# Patient Record
Sex: FEMALE | Race: WHITE | NOT HISPANIC OR LATINO | Employment: OTHER | URBAN - METROPOLITAN AREA
[De-identification: names, ages, dates, MRNs, and addresses within clinical notes are randomized per-mention and may not be internally consistent; named-entity substitution may affect disease eponyms.]

---

## 2023-03-10 ENCOUNTER — TELEPHONE (OUTPATIENT)
Dept: PRIMARY CARE | Facility: CLINIC | Age: 83
End: 2023-03-10
Payer: MEDICARE

## 2023-03-10 NOTE — TELEPHONE ENCOUNTER
[facility] is calling for pt's most recent chart notes so they can fill her glucose test strips.  Fax# [phone]

## 2023-07-19 DIAGNOSIS — K21.9 GASTROESOPHAGEAL REFLUX DISEASE, UNSPECIFIED WHETHER ESOPHAGITIS PRESENT: ICD-10-CM

## 2023-07-20 PROBLEM — M17.11 RIGHT KNEE DJD: Status: ACTIVE | Noted: 2023-07-20

## 2023-07-20 PROBLEM — R55 NEAR SYNCOPE: Status: ACTIVE | Noted: 2023-07-20

## 2023-07-20 PROBLEM — G89.29 CHRONIC PAIN OF RIGHT KNEE: Status: ACTIVE | Noted: 2023-07-20

## 2023-07-20 PROBLEM — M15.9 GENERALIZED OSTEOARTHRITIS OF MULTIPLE SITES: Status: ACTIVE | Noted: 2023-07-20

## 2023-07-20 PROBLEM — I10 HTN (HYPERTENSION), BENIGN: Status: ACTIVE | Noted: 2023-07-20

## 2023-07-20 PROBLEM — M79.18 PIRIFORMIS MUSCLE PAIN: Status: ACTIVE | Noted: 2023-07-20

## 2023-07-20 PROBLEM — M25.551 PAIN OF RIGHT HIP JOINT: Status: ACTIVE | Noted: 2023-07-20

## 2023-07-20 PROBLEM — N18.9 CKD (CHRONIC KIDNEY DISEASE): Status: ACTIVE | Noted: 2023-07-20

## 2023-07-20 PROBLEM — D50.9 IRON DEFICIENCY ANEMIA: Status: ACTIVE | Noted: 2023-07-20

## 2023-07-20 PROBLEM — E78.5 HYPERLIPIDEMIA: Status: ACTIVE | Noted: 2023-07-20

## 2023-07-20 PROBLEM — R30.0 DYSURIA: Status: ACTIVE | Noted: 2023-07-20

## 2023-07-20 PROBLEM — K21.9 GERD (GASTROESOPHAGEAL REFLUX DISEASE): Status: ACTIVE | Noted: 2023-07-20

## 2023-07-20 PROBLEM — I48.0 PAROXYSMAL ATRIAL FIBRILLATION (MULTI): Status: ACTIVE | Noted: 2023-07-20

## 2023-07-20 PROBLEM — R06.09 DYSPNEA ON EXERTION: Status: ACTIVE | Noted: 2023-07-20

## 2023-07-20 PROBLEM — M25.561 CHRONIC PAIN OF RIGHT KNEE: Status: ACTIVE | Noted: 2023-07-20

## 2023-07-20 RX ORDER — LISINOPRIL 20 MG/1
1 TABLET ORAL DAILY
COMMUNITY
End: 2023-11-29 | Stop reason: SDUPTHER

## 2023-07-20 RX ORDER — OMEPRAZOLE 20 MG/1
CAPSULE, DELAYED RELEASE ORAL
Qty: 90 CAPSULE | Refills: 0 | Status: SHIPPED | OUTPATIENT
Start: 2023-07-20 | End: 2023-10-27

## 2023-07-20 RX ORDER — MELOXICAM 15 MG/1
TABLET ORAL
COMMUNITY
Start: 2022-07-14 | End: 2023-11-29 | Stop reason: SDUPTHER

## 2023-07-20 RX ORDER — METFORMIN HYDROCHLORIDE 500 MG/1
500 TABLET ORAL
COMMUNITY
End: 2023-08-02

## 2023-07-20 RX ORDER — OMEPRAZOLE 20 MG/1
CAPSULE, DELAYED RELEASE ORAL
COMMUNITY
Start: 2016-08-26 | End: 2023-11-29 | Stop reason: SDUPTHER

## 2023-07-20 RX ORDER — PRAVASTATIN SODIUM 80 MG/1
1 TABLET ORAL DAILY
COMMUNITY
Start: 2021-10-18 | End: 2023-07-31

## 2023-07-20 RX ORDER — ASPIRIN 81 MG/1
1 TABLET ORAL DAILY
COMMUNITY
Start: 2021-12-22

## 2023-07-20 RX ORDER — CALCIUM CITRATE/VITAMIN D3 200MG-6.25
TABLET ORAL
COMMUNITY
End: 2023-11-09 | Stop reason: SDUPTHER

## 2023-07-20 RX ORDER — AMLODIPINE BESYLATE 10 MG/1
1 TABLET ORAL DAILY
COMMUNITY
Start: 2021-12-22 | End: 2023-11-29 | Stop reason: SDUPTHER

## 2023-07-20 NOTE — TELEPHONE ENCOUNTER
Requested Prescriptions     Pending Prescriptions Disp Refills    omeprazole (PriLOSEC) 20 mg DR capsule [Pharmacy Med Name: OMEPRAZOLE DR CAPS 20MG] 90 capsule 3     Sig: TAKE 1 CAPSULE DAILY BEFORE A MEAL

## 2023-08-01 DIAGNOSIS — E11.9 TYPE 2 DIABETES MELLITUS WITHOUT COMPLICATION, UNSPECIFIED WHETHER LONG TERM INSULIN USE (MULTI): ICD-10-CM

## 2023-08-02 PROBLEM — E11.9 TYPE 2 DIABETES MELLITUS (MULTI): Status: ACTIVE | Noted: 2023-08-02

## 2023-08-02 RX ORDER — METFORMIN HYDROCHLORIDE 500 MG/1
500 TABLET ORAL
Qty: 60 TABLET | Refills: 0 | Status: SHIPPED | OUTPATIENT
Start: 2023-08-02 | End: 2023-08-08 | Stop reason: SDUPTHER

## 2023-08-02 NOTE — TELEPHONE ENCOUNTER
Requested Prescriptions     Pending Prescriptions Disp Refills    metFORMIN (Glucophage) 500 mg tablet [Pharmacy Med Name: METFORMIN HCL TABS 500MG] 60 tablet 0     Sig: Take 1 tablet (500 mg) by mouth 2 times a day with meals.

## 2023-08-04 DIAGNOSIS — E11.9 TYPE 2 DIABETES MELLITUS WITHOUT COMPLICATION, UNSPECIFIED WHETHER LONG TERM INSULIN USE (MULTI): ICD-10-CM

## 2023-08-04 RX ORDER — METFORMIN HYDROCHLORIDE 500 MG/1
500 TABLET ORAL
Qty: 180 TABLET | Refills: 1 | OUTPATIENT
Start: 2023-08-04 | End: 2024-01-31

## 2023-08-04 NOTE — TELEPHONE ENCOUNTER
Requested Prescriptions     Pending Prescriptions Disp Refills    metFORMIN (Glucophage) 500 mg tablet [Pharmacy Med Name: METFORMIN HCL TABS 500MG] 180 tablet 1     Sig: Take 1 tablet (500 mg) by mouth 2 times a day with meals.

## 2023-08-08 RX ORDER — METFORMIN HYDROCHLORIDE 500 MG/1
500 TABLET ORAL
Qty: 180 TABLET | Refills: 0 | Status: SHIPPED | OUTPATIENT
Start: 2023-08-08 | End: 2023-11-29 | Stop reason: SDUPTHER

## 2023-08-08 NOTE — TELEPHONE ENCOUNTER
Patient states that medication was sent to the wrong pharmacy.  Patient states that it should have gone to [facility] and not [facility].  Patient can be reached at [phone].

## 2023-11-03 DIAGNOSIS — I10 HTN (HYPERTENSION), BENIGN: ICD-10-CM

## 2023-11-03 NOTE — TELEPHONE ENCOUNTER
Can send 30 to local. Not sen since before 3/3.   Pt overdue for appointment. Multiple 30 day fills sent. Please refuse request

## 2023-11-06 RX ORDER — PRAVASTATIN SODIUM 80 MG/1
80 TABLET ORAL DAILY
Qty: 90 TABLET | Refills: 3 | Status: SHIPPED | OUTPATIENT
Start: 2023-11-06 | End: 2023-11-29 | Stop reason: SDUPTHER

## 2023-11-09 ENCOUNTER — PATIENT MESSAGE (OUTPATIENT)
Dept: PRIMARY CARE | Facility: CLINIC | Age: 83
End: 2023-11-09
Payer: MEDICARE

## 2023-11-09 DIAGNOSIS — E11.22 TYPE 2 DIABETES MELLITUS WITH STAGE 3B CHRONIC KIDNEY DISEASE, WITHOUT LONG-TERM CURRENT USE OF INSULIN (MULTI): Primary | ICD-10-CM

## 2023-11-09 DIAGNOSIS — N18.32 TYPE 2 DIABETES MELLITUS WITH STAGE 3B CHRONIC KIDNEY DISEASE, WITHOUT LONG-TERM CURRENT USE OF INSULIN (MULTI): Primary | ICD-10-CM

## 2023-11-09 NOTE — PATIENT COMMUNICATION
Requested Prescriptions     Pending Prescriptions Disp Refills    blood sugar diagnostic (True Metrix Glucose Test Strip) strip 300 strip 1     Sig: TEST blood sugar twice daily

## 2023-11-13 RX ORDER — CALCIUM CITRATE/VITAMIN D3 200MG-6.25
TABLET ORAL
Qty: 300 STRIP | Refills: 0 | Status: SHIPPED | OUTPATIENT
Start: 2023-11-13

## 2023-11-29 ENCOUNTER — OFFICE VISIT (OUTPATIENT)
Dept: PRIMARY CARE | Facility: CLINIC | Age: 83
End: 2023-11-29
Payer: MEDICARE

## 2023-11-29 ENCOUNTER — LAB (OUTPATIENT)
Dept: LAB | Facility: LAB | Age: 83
End: 2023-11-29
Payer: MEDICARE

## 2023-11-29 VITALS
HEART RATE: 75 BPM | RESPIRATION RATE: 18 BRPM | TEMPERATURE: 98.6 F | BODY MASS INDEX: 28.25 KG/M2 | DIASTOLIC BLOOD PRESSURE: 74 MMHG | WEIGHT: 175 LBS | OXYGEN SATURATION: 96 % | SYSTOLIC BLOOD PRESSURE: 130 MMHG

## 2023-11-29 DIAGNOSIS — I48.0 PAROXYSMAL ATRIAL FIBRILLATION (MULTI): ICD-10-CM

## 2023-11-29 DIAGNOSIS — E11.9 TYPE 2 DIABETES MELLITUS WITHOUT COMPLICATION, UNSPECIFIED WHETHER LONG TERM INSULIN USE (MULTI): ICD-10-CM

## 2023-11-29 DIAGNOSIS — N18.9 CHRONIC KIDNEY DISEASE, UNSPECIFIED CKD STAGE: ICD-10-CM

## 2023-11-29 DIAGNOSIS — I10 HTN (HYPERTENSION), BENIGN: ICD-10-CM

## 2023-11-29 DIAGNOSIS — Z13.21 ENCOUNTER FOR VITAMIN DEFICIENCY SCREENING: ICD-10-CM

## 2023-11-29 DIAGNOSIS — M15.9 GENERALIZED OSTEOARTHRITIS OF MULTIPLE SITES: ICD-10-CM

## 2023-11-29 DIAGNOSIS — E53.8 B12 DEFICIENCY: Primary | ICD-10-CM

## 2023-11-29 DIAGNOSIS — E78.5 HYPERLIPIDEMIA, UNSPECIFIED HYPERLIPIDEMIA TYPE: ICD-10-CM

## 2023-11-29 DIAGNOSIS — E78.1 HYPERTRIGLYCERIDEMIA: ICD-10-CM

## 2023-11-29 DIAGNOSIS — K21.9 GASTROESOPHAGEAL REFLUX DISEASE WITHOUT ESOPHAGITIS: Primary | ICD-10-CM

## 2023-11-29 DIAGNOSIS — Z13.29 SCREENING FOR THYROID DISORDER: ICD-10-CM

## 2023-11-29 LAB
ALBUMIN SERPL BCP-MCNC: 4.2 G/DL (ref 3.4–5)
ALP SERPL-CCNC: 63 U/L (ref 33–136)
ALT SERPL W P-5'-P-CCNC: 12 U/L (ref 7–45)
ANION GAP SERPL CALC-SCNC: 16 MMOL/L (ref 10–20)
AST SERPL W P-5'-P-CCNC: 15 U/L (ref 9–39)
BILIRUB SERPL-MCNC: 0.3 MG/DL (ref 0–1.2)
BUN SERPL-MCNC: 39 MG/DL (ref 6–23)
CALCIUM SERPL-MCNC: 9.6 MG/DL (ref 8.6–10.3)
CHLORIDE SERPL-SCNC: 97 MMOL/L (ref 98–107)
CHOLEST SERPL-MCNC: 153 MG/DL (ref 0–199)
CHOLESTEROL/HDL RATIO: 3.4
CO2 SERPL-SCNC: 27 MMOL/L (ref 21–32)
CREAT SERPL-MCNC: 1.39 MG/DL (ref 0.5–1.05)
ERYTHROCYTE [DISTWIDTH] IN BLOOD BY AUTOMATED COUNT: 12.5 % (ref 11.5–14.5)
EST. AVERAGE GLUCOSE BLD GHB EST-MCNC: 171 MG/DL
GFR SERPL CREATININE-BSD FRML MDRD: 37 ML/MIN/1.73M*2
GLUCOSE SERPL-MCNC: 145 MG/DL (ref 74–99)
HBA1C MFR BLD: 7.6 %
HCT VFR BLD AUTO: 40 % (ref 36–46)
HDLC SERPL-MCNC: 45.5 MG/DL
HGB BLD-MCNC: 12.6 G/DL (ref 12–16)
LDLC SERPL CALC-MCNC: 65 MG/DL
MCH RBC QN AUTO: 28.1 PG (ref 26–34)
MCHC RBC AUTO-ENTMCNC: 31.5 G/DL (ref 32–36)
MCV RBC AUTO: 89 FL (ref 80–100)
NON HDL CHOLESTEROL: 108 MG/DL (ref 0–149)
NRBC BLD-RTO: 0 /100 WBCS (ref 0–0)
PLATELET # BLD AUTO: 319 X10*3/UL (ref 150–450)
POTASSIUM SERPL-SCNC: 4.9 MMOL/L (ref 3.5–5.3)
PROT SERPL-MCNC: 7.1 G/DL (ref 6.4–8.2)
RBC # BLD AUTO: 4.49 X10*6/UL (ref 4–5.2)
SODIUM SERPL-SCNC: 135 MMOL/L (ref 136–145)
TRIGL SERPL-MCNC: 211 MG/DL (ref 0–149)
TSH SERPL-ACNC: 1.14 MIU/L (ref 0.44–3.98)
VIT B12 SERPL-MCNC: 253 PG/ML (ref 211–911)
VLDL: 42 MG/DL (ref 0–40)
WBC # BLD AUTO: 9.7 X10*3/UL (ref 4.4–11.3)

## 2023-11-29 PROCEDURE — 1036F TOBACCO NON-USER: CPT | Performed by: NURSE PRACTITIONER

## 2023-11-29 PROCEDURE — 82652 VIT D 1 25-DIHYDROXY: CPT

## 2023-11-29 PROCEDURE — 3078F DIAST BP <80 MM HG: CPT | Performed by: NURSE PRACTITIONER

## 2023-11-29 PROCEDURE — 99214 OFFICE O/P EST MOD 30 MIN: CPT | Performed by: NURSE PRACTITIONER

## 2023-11-29 PROCEDURE — 1159F MED LIST DOCD IN RCRD: CPT | Performed by: NURSE PRACTITIONER

## 2023-11-29 PROCEDURE — 3075F SYST BP GE 130 - 139MM HG: CPT | Performed by: NURSE PRACTITIONER

## 2023-11-29 PROCEDURE — 82607 VITAMIN B-12: CPT

## 2023-11-29 PROCEDURE — 83036 HEMOGLOBIN GLYCOSYLATED A1C: CPT

## 2023-11-29 PROCEDURE — 85027 COMPLETE CBC AUTOMATED: CPT

## 2023-11-29 PROCEDURE — 80053 COMPREHEN METABOLIC PANEL: CPT

## 2023-11-29 PROCEDURE — 84443 ASSAY THYROID STIM HORMONE: CPT

## 2023-11-29 PROCEDURE — 80061 LIPID PANEL: CPT

## 2023-11-29 PROCEDURE — 36415 COLL VENOUS BLD VENIPUNCTURE: CPT

## 2023-11-29 RX ORDER — PRAVASTATIN SODIUM 80 MG/1
80 TABLET ORAL DAILY
Qty: 90 TABLET | Refills: 3 | Status: SHIPPED | OUTPATIENT
Start: 2023-11-29

## 2023-11-29 RX ORDER — OMEPRAZOLE 20 MG/1
20 CAPSULE, DELAYED RELEASE ORAL
Qty: 90 CAPSULE | Refills: 1 | Status: SHIPPED | OUTPATIENT
Start: 2023-11-29 | End: 2024-05-27

## 2023-11-29 RX ORDER — VERAPAMIL HYDROCHLORIDE 240 MG/1
240 TABLET, FILM COATED, EXTENDED RELEASE ORAL NIGHTLY
COMMUNITY
End: 2023-12-04 | Stop reason: WASHOUT

## 2023-11-29 RX ORDER — MELOXICAM 15 MG/1
15 TABLET ORAL DAILY
Qty: 90 TABLET | Refills: 1 | Status: SHIPPED | OUTPATIENT
Start: 2023-11-29 | End: 2024-05-27

## 2023-11-29 RX ORDER — AMLODIPINE BESYLATE 10 MG/1
10 TABLET ORAL DAILY
Qty: 90 TABLET | Refills: 1 | Status: SHIPPED | OUTPATIENT
Start: 2023-11-29 | End: 2024-05-27

## 2023-11-29 RX ORDER — LISINOPRIL 20 MG/1
20 TABLET ORAL DAILY
Qty: 90 TABLET | Refills: 1 | Status: SHIPPED | OUTPATIENT
Start: 2023-11-29 | End: 2024-05-27

## 2023-11-29 RX ORDER — METFORMIN HYDROCHLORIDE 500 MG/1
500 TABLET ORAL
Qty: 180 TABLET | Refills: 0 | Status: SHIPPED | OUTPATIENT
Start: 2023-11-29 | End: 2024-02-21

## 2023-11-29 ASSESSMENT — ENCOUNTER SYMPTOMS
WHEEZING: 0
DIZZINESS: 0
LIGHT-HEADEDNESS: 0
CHILLS: 0
FATIGUE: 0
NECK PAIN: 1
COUGH: 0
PALPITATIONS: 0
FEVER: 0
NECK STIFFNESS: 1
SHORTNESS OF BREATH: 1

## 2023-11-29 NOTE — PROGRESS NOTES
Subjective   Meghna Escobedo is a 83 y.o. female who presents for Follow-up (Regular check up) and Neck Pain.  HPI  Review of Systems   Constitutional:  Negative for chills, fatigue and fever.   Respiratory:  Positive for shortness of breath (occasional with walking long distances). Negative for cough and wheezing.    Cardiovascular:  Negative for chest pain and palpitations.   Musculoskeletal:  Positive for neck pain and neck stiffness.   Neurological:  Negative for dizziness and light-headedness.     Objective   Physical Exam  Vitals reviewed.   Constitutional:       Appearance: Normal appearance.   HENT:      Head: Normocephalic.   Eyes:      Conjunctiva/sclera: Conjunctivae normal.   Neck:      Comments: Musculoskeletal neck discomfort, mostly posterior neck with slightly decreased ROM when turning toward her right side.   Cardiovascular:      Rate and Rhythm: Normal rate and regular rhythm.      Pulses: Normal pulses.   Pulmonary:      Breath sounds: Normal breath sounds.   Abdominal:      General: Bowel sounds are normal.      Palpations: Abdomen is soft.   Musculoskeletal:      Cervical back: Neck supple. No rigidity.      Right lower leg: No edema.      Left lower leg: No edema.   Skin:     General: Skin is warm and dry.   Neurological:      General: No focal deficit present.      Mental Status: She is alert.   Psychiatric:         Mood and Affect: Mood normal.         Thought Content: Thought content normal.     /74   Pulse 75   Temp 37 °C (98.6 °F)   Resp 18   Wt 79.4 kg (175 lb)   SpO2 96%   BMI 28.25 kg/m²   Assessment/Plan   1. Gastroesophageal reflux disease without esophagitis  omeprazole (PriLOSEC) 20 mg DR capsule      2. Type 2 diabetes mellitus without complication, unspecified whether long term insulin use (CMS/[facility])  metFORMIN (Glucophage) 500 mg tablet    Hemoglobin A1C      3. HTN (hypertension), benign  amLODIPine (Norvasc) 10 mg tablet    lisinopril 20 mg tablet    pravastatin  (Pravachol) 80 mg tablet    CBC    Comprehensive Metabolic Panel      4. Generalized osteoarthritis of multiple sites  meloxicam (Mobic) 15 mg tablet      5. Paroxysmal atrial fibrillation (CMS/HCC)        6. Chronic kidney disease, unspecified CKD stage  Comprehensive Metabolic Panel      7. Hyperlipidemia, unspecified hyperlipidemia type  Lipid Panel      8. Encounter for vitamin deficiency screening  Vitamin B12    Vitamin D 1,25 Dihydroxy (for eval of hypercalcemia)      9. Screening for thyroid disorder  Thyroid Stimulating Hormone      Follow-up in 6 months with Dr. Hudson.

## 2023-12-02 LAB — 1,25(OH)2D3 SERPL-MCNC: 39.5 PG/ML (ref 19.9–79.3)

## 2023-12-04 RX ORDER — CYANOCOBALAMIN 1000 UG/ML
1000 INJECTION, SOLUTION INTRAMUSCULAR; SUBCUTANEOUS
Status: SHIPPED | OUTPATIENT
Start: 2023-12-05 | End: 2024-04-03

## 2023-12-04 RX ORDER — FENOFIBRATE 48 MG/1
48 TABLET, FILM COATED ORAL DAILY
Qty: 90 TABLET | Refills: 1 | Status: SHIPPED | OUTPATIENT
Start: 2023-12-04 | End: 2024-05-29

## 2024-01-22 DIAGNOSIS — I10 HTN (HYPERTENSION), BENIGN: Primary | ICD-10-CM

## 2024-01-22 RX ORDER — CHLORTHALIDONE 25 MG/1
25 TABLET ORAL DAILY
Qty: 90 TABLET | Refills: 0 | Status: SHIPPED | OUTPATIENT
Start: 2024-01-22 | End: 2024-04-26

## 2024-02-20 DIAGNOSIS — E11.9 TYPE 2 DIABETES MELLITUS WITHOUT COMPLICATION, UNSPECIFIED WHETHER LONG TERM INSULIN USE (MULTI): ICD-10-CM

## 2024-02-21 RX ORDER — METFORMIN HYDROCHLORIDE 500 MG/1
500 TABLET ORAL
Qty: 180 TABLET | Refills: 1 | Status: SHIPPED | OUTPATIENT
Start: 2024-02-21 | End: 2024-08-19

## 2024-04-24 DIAGNOSIS — I10 HTN (HYPERTENSION), BENIGN: ICD-10-CM

## 2024-04-25 NOTE — TELEPHONE ENCOUNTER
Pt last OV 11/29/23    Requested Prescriptions     Pending Prescriptions Disp Refills    chlorthalidone (Hygroton) 25 mg tablet [Pharmacy Med Name: CHLORTHALIDONE TABS 25MG] 90 tablet 0     Sig: Take 1 tablet (25 mg) by mouth once daily.        36.1

## 2024-04-26 RX ORDER — CHLORTHALIDONE 25 MG/1
25 TABLET ORAL DAILY
Qty: 30 TABLET | Refills: 0 | Status: SHIPPED | OUTPATIENT
Start: 2024-04-26 | End: 2024-05-02

## 2024-07-03 DIAGNOSIS — I10 HTN (HYPERTENSION), BENIGN: ICD-10-CM

## 2024-07-03 RX ORDER — AMLODIPINE BESYLATE 10 MG/1
10 TABLET ORAL DAILY
Qty: 30 TABLET | Refills: 0 | Status: SHIPPED | OUTPATIENT
Start: 2024-07-03 | End: 2024-08-02

## 2024-07-03 NOTE — TELEPHONE ENCOUNTER
Pt last OV 11/29/23  Requested Prescriptions     Pending Prescriptions Disp Refills    amLODIPine (Norvasc) 10 mg tablet [Pharmacy Med Name: AMLODIPINE BESYLATE TABS 10MG] 30 tablet 0     Sig: Take 1 tablet (10 mg) by mouth once daily.

## 2024-07-18 DIAGNOSIS — K21.9 GASTROESOPHAGEAL REFLUX DISEASE WITHOUT ESOPHAGITIS: ICD-10-CM

## 2024-07-18 RX ORDER — OMEPRAZOLE 20 MG/1
20 CAPSULE, DELAYED RELEASE ORAL
Qty: 90 CAPSULE | Refills: 0 | Status: SHIPPED | OUTPATIENT
Start: 2024-07-18

## 2024-07-18 NOTE — TELEPHONE ENCOUNTER
Pt last OV 11/29/2023  Requested Prescriptions     Pending Prescriptions Disp Refills    omeprazole (PriLOSEC) 20 mg DR capsule [Pharmacy Med Name: OMEPRAZOLE DR CAPS 20MG] 90 capsule 0     Sig: TAKE 1 CAPSULE DAILY IN THE MORNING BEFORE A MEAL

## 2024-07-30 DIAGNOSIS — M15.9 GENERALIZED OSTEOARTHRITIS OF MULTIPLE SITES: ICD-10-CM

## 2024-07-30 DIAGNOSIS — I10 HTN (HYPERTENSION), BENIGN: ICD-10-CM

## 2024-07-31 NOTE — TELEPHONE ENCOUNTER
Pt last OV 11/29/2023  Requested Prescriptions     Pending Prescriptions Disp Refills    meloxicam (Mobic) 15 mg tablet [Pharmacy Med Name: MELOXICAM TABS 15MG] 90 tablet 0     Sig: Take 1 tablet (15 mg) by mouth once daily.

## 2024-07-31 NOTE — TELEPHONE ENCOUNTER
Pt last OV 11/29/2023  Requested Prescriptions     Pending Prescriptions Disp Refills    amLODIPine (Norvasc) 10 mg tablet [Pharmacy Med Name: AMLODIPINE BESYLATE TABS 10MG] 90 tablet 0     Sig: Take 1 tablet (10 mg) by mouth once daily.

## 2024-08-01 ENCOUNTER — APPOINTMENT (OUTPATIENT)
Dept: PRIMARY CARE | Facility: CLINIC | Age: 84
End: 2024-08-01
Payer: MEDICARE

## 2024-08-01 VITALS
TEMPERATURE: 97.2 F | SYSTOLIC BLOOD PRESSURE: 164 MMHG | HEIGHT: 66 IN | RESPIRATION RATE: 20 BRPM | DIASTOLIC BLOOD PRESSURE: 78 MMHG | BODY MASS INDEX: 26.36 KG/M2 | WEIGHT: 164 LBS | HEART RATE: 64 BPM

## 2024-08-01 DIAGNOSIS — E11.9 ENCOUNTER FOR DIABETIC FOOT EXAM (MULTI): ICD-10-CM

## 2024-08-01 DIAGNOSIS — E11.9 TYPE 2 DIABETES MELLITUS WITHOUT COMPLICATION, UNSPECIFIED WHETHER LONG TERM INSULIN USE (MULTI): ICD-10-CM

## 2024-08-01 DIAGNOSIS — N18.31 STAGE 3A CHRONIC KIDNEY DISEASE (MULTI): ICD-10-CM

## 2024-08-01 DIAGNOSIS — Z78.0 ASYMPTOMATIC MENOPAUSAL STATE: ICD-10-CM

## 2024-08-01 DIAGNOSIS — E11.22 TYPE 2 DIABETES MELLITUS WITH STAGE 3B CHRONIC KIDNEY DISEASE, WITHOUT LONG-TERM CURRENT USE OF INSULIN (MULTI): ICD-10-CM

## 2024-08-01 DIAGNOSIS — Z13.29 SCREENING FOR THYROID DISORDER: ICD-10-CM

## 2024-08-01 DIAGNOSIS — E11.9 TYPE 2 DIABETES MELLITUS WITHOUT COMPLICATION, WITHOUT LONG-TERM CURRENT USE OF INSULIN (MULTI): ICD-10-CM

## 2024-08-01 DIAGNOSIS — E78.5 HYPERLIPIDEMIA, UNSPECIFIED HYPERLIPIDEMIA TYPE: ICD-10-CM

## 2024-08-01 DIAGNOSIS — N18.32 TYPE 2 DIABETES MELLITUS WITH STAGE 3B CHRONIC KIDNEY DISEASE, WITHOUT LONG-TERM CURRENT USE OF INSULIN (MULTI): ICD-10-CM

## 2024-08-01 DIAGNOSIS — E55.9 VITAMIN D DEFICIENCY: ICD-10-CM

## 2024-08-01 DIAGNOSIS — Z00.00 ROUTINE GENERAL MEDICAL EXAMINATION AT HEALTH CARE FACILITY: Primary | ICD-10-CM

## 2024-08-01 DIAGNOSIS — I48.0 PAROXYSMAL ATRIAL FIBRILLATION (MULTI): ICD-10-CM

## 2024-08-01 DIAGNOSIS — E78.1 HYPERTRIGLYCERIDEMIA: ICD-10-CM

## 2024-08-01 DIAGNOSIS — K21.9 GASTROESOPHAGEAL REFLUX DISEASE WITHOUT ESOPHAGITIS: ICD-10-CM

## 2024-08-01 DIAGNOSIS — I10 HTN (HYPERTENSION), BENIGN: ICD-10-CM

## 2024-08-01 DIAGNOSIS — E53.8 B12 DEFICIENCY: ICD-10-CM

## 2024-08-01 LAB — POC HEMOGLOBIN A1C: 8.3 % (ref 4.2–6.5)

## 2024-08-01 PROCEDURE — G0439 PPPS, SUBSEQ VISIT: HCPCS | Performed by: NURSE PRACTITIONER

## 2024-08-01 PROCEDURE — 1036F TOBACCO NON-USER: CPT | Performed by: NURSE PRACTITIONER

## 2024-08-01 PROCEDURE — 1170F FXNL STATUS ASSESSED: CPT | Performed by: NURSE PRACTITIONER

## 2024-08-01 PROCEDURE — 1158F ADVNC CARE PLAN TLK DOCD: CPT | Performed by: NURSE PRACTITIONER

## 2024-08-01 PROCEDURE — 3078F DIAST BP <80 MM HG: CPT | Performed by: NURSE PRACTITIONER

## 2024-08-01 PROCEDURE — 3077F SYST BP >= 140 MM HG: CPT | Performed by: NURSE PRACTITIONER

## 2024-08-01 PROCEDURE — 1123F ACP DISCUSS/DSCN MKR DOCD: CPT | Performed by: NURSE PRACTITIONER

## 2024-08-01 PROCEDURE — 1159F MED LIST DOCD IN RCRD: CPT | Performed by: NURSE PRACTITIONER

## 2024-08-01 PROCEDURE — 96372 THER/PROPH/DIAG INJ SC/IM: CPT | Performed by: NURSE PRACTITIONER

## 2024-08-01 PROCEDURE — 83036 HEMOGLOBIN GLYCOSYLATED A1C: CPT | Performed by: NURSE PRACTITIONER

## 2024-08-01 RX ORDER — MULTIVITAMIN
1 TABLET ORAL DAILY
COMMUNITY

## 2024-08-01 RX ORDER — CHLORTHALIDONE 25 MG/1
25 TABLET ORAL DAILY
Qty: 90 TABLET | Refills: 0 | Status: SHIPPED | OUTPATIENT
Start: 2024-08-01 | End: 2024-10-30

## 2024-08-01 RX ORDER — METFORMIN HYDROCHLORIDE 1000 MG/1
1000 TABLET ORAL
Qty: 180 TABLET | Refills: 3 | Status: SHIPPED | OUTPATIENT
Start: 2024-08-01 | End: 2025-08-01

## 2024-08-01 RX ORDER — OMEPRAZOLE 20 MG/1
20 CAPSULE, DELAYED RELEASE ORAL
Qty: 90 CAPSULE | Refills: 0 | Status: SHIPPED | OUTPATIENT
Start: 2024-08-01

## 2024-08-01 RX ORDER — FENOFIBRATE 48 MG/1
48 TABLET, FILM COATED ORAL DAILY
Qty: 90 TABLET | Refills: 0 | Status: SHIPPED | OUTPATIENT
Start: 2024-08-01

## 2024-08-01 RX ORDER — LISINOPRIL 20 MG/1
20 TABLET ORAL DAILY
Qty: 90 TABLET | Refills: 0 | Status: SHIPPED | OUTPATIENT
Start: 2024-08-01 | End: 2024-08-01 | Stop reason: DRUGHIGH

## 2024-08-01 RX ORDER — METFORMIN HYDROCHLORIDE 500 MG/1
500 TABLET ORAL
Qty: 180 TABLET | Refills: 1 | Status: SHIPPED | OUTPATIENT
Start: 2024-08-01 | End: 2024-08-01

## 2024-08-01 RX ORDER — AMLODIPINE BESYLATE 10 MG/1
10 TABLET ORAL DAILY
Qty: 90 TABLET | Refills: 0 | OUTPATIENT
Start: 2024-08-01 | End: 2024-10-29

## 2024-08-01 RX ORDER — BENAZEPRIL HYDROCHLORIDE AND HYDROCHLOROTHIAZIDE 20; 25 MG/1; MG/1
1 TABLET ORAL DAILY
Qty: 90 TABLET | Refills: 3 | Status: SHIPPED | OUTPATIENT
Start: 2024-08-01 | End: 2025-08-01

## 2024-08-01 RX ORDER — AMLODIPINE BESYLATE 10 MG/1
10 TABLET ORAL DAILY
Qty: 90 EACH | Refills: 3 | Status: SHIPPED | OUTPATIENT
Start: 2024-08-01 | End: 2025-08-01

## 2024-08-01 RX ORDER — CALCIUM CITRATE/VITAMIN D3 200MG-6.25
TABLET ORAL
Qty: 300 STRIP | Refills: 0 | Status: SHIPPED | OUTPATIENT
Start: 2024-08-01

## 2024-08-01 RX ORDER — MELOXICAM 15 MG/1
15 TABLET ORAL DAILY
Qty: 90 TABLET | Refills: 3 | Status: SHIPPED | OUTPATIENT
Start: 2024-08-01 | End: 2025-08-01

## 2024-08-01 RX ORDER — PRAVASTATIN SODIUM 80 MG/1
80 TABLET ORAL DAILY
Qty: 90 TABLET | Refills: 3 | Status: SHIPPED | OUTPATIENT
Start: 2024-08-01

## 2024-08-01 RX ORDER — CYANOCOBALAMIN 1000 UG/ML
1000 INJECTION, SOLUTION INTRAMUSCULAR; SUBCUTANEOUS ONCE
Status: COMPLETED | OUTPATIENT
Start: 2024-08-01 | End: 2024-08-01

## 2024-08-01 ASSESSMENT — PATIENT HEALTH QUESTIONNAIRE - PHQ9
2. FEELING DOWN, DEPRESSED OR HOPELESS: NOT AT ALL
1. LITTLE INTEREST OR PLEASURE IN DOING THINGS: NOT AT ALL
SUM OF ALL RESPONSES TO PHQ9 QUESTIONS 1 AND 2: 0

## 2024-08-01 ASSESSMENT — ENCOUNTER SYMPTOMS
PALPITATIONS: 0
NAUSEA: 0
CONSTIPATION: 0
ABDOMINAL DISTENTION: 0
DIARRHEA: 0
SHORTNESS OF BREATH: 0
COUGH: 0
VOMITING: 0

## 2024-08-01 ASSESSMENT — ACTIVITIES OF DAILY LIVING (ADL)
DOING_HOUSEWORK: INDEPENDENT
TAKING_MEDICATION: INDEPENDENT
MANAGING_FINANCES: INDEPENDENT
BATHING: INDEPENDENT
GROCERY_SHOPPING: INDEPENDENT
DRESSING: INDEPENDENT

## 2024-08-01 NOTE — ASSESSMENT & PLAN NOTE
A1C 8.3% today. Pt. Checks BS BID and states it is typically 190s upon rising and closer to 100 at HS. She reports frequent urination at night, but normal during the day. States she eats 2 meals per day. Will increase metformin to 1000 mg BID with meals. Referred to [facility] for further management.

## 2024-08-01 NOTE — PROGRESS NOTES
"Subjective   Reason for Visit: Meghna Escobedo is an 83 y.o. female here for a Medicare Wellness visit.     Past Medical, Surgical, and Family History reviewed and updated in chart.    Reviewed all medications by prescribing practitioner or clinical pharmacist (such as prescriptions, OTCs, herbal therapies and supplements) and documented in the medical record.    HPI  Patient Care Team:  GARY Perez-CNP as PCP - General (Gerontology)     Review of Systems   Respiratory:  Negative for cough and shortness of breath.    Cardiovascular:  Negative for chest pain, palpitations and leg swelling.   Gastrointestinal:  Negative for abdominal distention, constipation, diarrhea, nausea and vomiting.        Increased belching.    Genitourinary:  Positive for enuresis (frequent urination at night. Normal frequency during the day.). Negative for urgency.   Objective   Vitals:  /78   Pulse 64   Temp 36.2 °C (97.2 °F)   Resp 20   Ht 1.676 m (5' 6\")   Wt 74.4 kg (164 lb)   BMI 26.47 kg/m²     Physical Exam  Vitals reviewed.   Constitutional:       Appearance: Normal appearance.   HENT:      Head: Normocephalic.      Right Ear: Tympanic membrane normal.      Left Ear: Tympanic membrane normal.   Eyes:      Conjunctiva/sclera: Conjunctivae normal.   Cardiovascular:      Rate and Rhythm: Normal rate and regular rhythm.      Pulses: Normal pulses.      Heart sounds: No murmur heard.  Pulmonary:      Effort: Pulmonary effort is normal.      Breath sounds: Normal breath sounds.   Abdominal:      General: Bowel sounds are normal.      Palpations: Abdomen is soft.   Musculoskeletal:      Cervical back: Neck supple.      Right lower leg: No edema.      Left lower leg: No edema.   Skin:     General: Skin is warm and dry.      Findings: No rash.   Neurological:      General: No focal deficit present.      Mental Status: She is alert and oriented to person, place, and time.   Psychiatric:         Mood and Affect: Mood " normal.         Thought Content: Thought content normal.     Assessment/Plan   Problem List Items Addressed This Visit             ICD-10-CM    CKD (chronic kidney disease) N18.9    Relevant Orders    Comprehensive Metabolic Panel    HTN (hypertension), benign I10    Relevant Medications    chlorthalidone (Hygroton) 25 mg tablet    amLODIPine (Norvasc) 10 mg tablet    pravastatin (Pravachol) 80 mg tablet    benazepriL-hydrochlorothiazide (Lotensin HCT) 20-25 mg tablet    Other Relevant Orders    CBC    GERD (gastroesophageal reflux disease) K21.9    Relevant Medications    omeprazole (PriLOSEC) 20 mg DR capsule    Hyperlipidemia E78.5    Relevant Medications    fenofibrate (Tricor) 48 mg tablet    Other Relevant Orders    Lipid Panel    RESOLVED: Paroxysmal atrial fibrillation (Multi) I48.0    Relevant Medications    amLODIPine (Norvasc) 10 mg tablet    Type 2 diabetes mellitus (Multi) E11.9     A1C 8.3% today. Pt. Checks BS BID and states it is typically 190s upon rising and closer to 100 at HS. She reports frequent urination at night, but normal during the day. States she eats 2 meals per day. Will increase metformin to 1000 mg BID with meals. Referred to [facility] for further management.           Relevant Medications    blood sugar diagnostic (True Metrix Glucose Test Strip) strip    metFORMIN (Glucophage) 1,000 mg tablet    Other Relevant Orders    POCT glycosylated hemoglobin (Hb A1C) manually resulted (Completed)    Follow Up In [facility] - Pharmacy    Hemoglobin A1C     Other Visit Diagnoses         Codes    Routine general medical examination at health care facility    -  Primary Z00.00    Relevant Orders    1 Year Follow Up In [facility] - PCP - Wellness Exam    Follow Up In [facility] - PCP - Established    Hypertriglyceridemia     E78.1    Relevant Medications    fenofibrate (Tricor) 48 mg tablet    B12 deficiency     E53.8    Relevant Medications    cyanocobalamin  (Vitamin B-12) injection 1,000 mcg (Completed)    Other Relevant Orders    Vitamin B12    Asymptomatic menopausal state     Z78.0    Relevant Orders    XR DEXA bone density    Vitamin D deficiency     E55.9    Relevant Orders    Vitamin D 1,25 Dihydroxy (for eval of hypercalcemia)    Screening for thyroid disorder     Z13.29    Relevant Orders    TSH with reflex to Free T4 if abnormal    Encounter for diabetic foot exam (Multi)     E11.9    No wounds or lesions. Normal appearing toenails. No tinea noted. Normal sensation.           F/U in 6 months with lab draw prior to appointment.

## 2024-08-13 ENCOUNTER — HOSPITAL ENCOUNTER (OUTPATIENT)
Dept: RADIOLOGY | Facility: CLINIC | Age: 84
Discharge: HOME | End: 2024-08-13
Payer: MEDICARE

## 2024-08-13 DIAGNOSIS — Z78.0 ASYMPTOMATIC MENOPAUSAL STATE: ICD-10-CM

## 2024-08-13 PROCEDURE — 77080 DXA BONE DENSITY AXIAL: CPT

## 2024-08-13 ASSESSMENT — LIFESTYLE VARIABLES
3_OR_MORE_DRINKS_PER_DAY: N
CURRENT_SMOKER: N

## 2024-08-18 PROBLEM — M85.852 OSTEOPENIA OF NECK OF LEFT FEMUR: Status: ACTIVE | Noted: 2024-08-18

## 2024-08-22 ENCOUNTER — TELEPHONE (OUTPATIENT)
Dept: PRIMARY CARE | Facility: CLINIC | Age: 84
End: 2024-08-22
Payer: MEDICARE

## 2024-08-22 NOTE — TELEPHONE ENCOUNTER
----- Message from Cate Rojas sent at 8/18/2024 10:52 PM EDT -----  Please call patient (I don't think she uses her MyChart) and notify her that her DEXA scan was generally good, with only mild osteopenia in only 1 of the areas tested, which was her left hip. She should continue to take her calcium-vitamin D daily supplement and should try to engage in weight-bearing exercise at least 2-3 times per week, such as walking and/or using resistance bands.

## 2024-08-27 ENCOUNTER — APPOINTMENT (OUTPATIENT)
Dept: PHARMACY | Facility: HOSPITAL | Age: 84
End: 2024-08-27
Payer: MEDICARE

## 2024-08-27 DIAGNOSIS — N18.32 TYPE 2 DIABETES MELLITUS WITH STAGE 3B CHRONIC KIDNEY DISEASE, WITHOUT LONG-TERM CURRENT USE OF INSULIN (MULTI): ICD-10-CM

## 2024-08-27 DIAGNOSIS — E11.22 TYPE 2 DIABETES MELLITUS WITH STAGE 3B CHRONIC KIDNEY DISEASE, WITHOUT LONG-TERM CURRENT USE OF INSULIN (MULTI): ICD-10-CM

## 2024-08-27 NOTE — PROGRESS NOTES
Subjective     Patient ID: Meghna Escobedo is a 84 y.o. female who presents for diabetes management.    Diabetes  She presents for her initial diabetic visit. She has type 2 diabetes mellitus. Risk factors for coronary artery disease include diabetes mellitus, hypertension and dyslipidemia. Current diabetic treatment includes oral agent (monotherapy). An ACE inhibitor/angiotensin II receptor blocker is being taken.     No Known Allergies    Objective     Current DM Pharmacotherapy:   -metformin 1,000 mg 1 po bid    SECONDARY PREVENTION  - Statin? Yes  - ACE-I/ARB? Yes  - Aspirin? No    Current monitoring regimen:   Patient is using: glucometer    SMBG Readings: none to report    Any episodes of hypoglycemia? No  Hypoglycemia awareness? No    Pertinent PMH Review:  - PMH of Pancreatitis: No  - PMH/FH of Medullary Thyroid Cancer: No  - PMH of Retinopathy: No  - PMH of Urinary Tract Infections: No    Lab Review  Lab Results   Component Value Date    BILITOT 0.3 11/29/2023    CALCIUM 9.6 11/29/2023    CO2 27 11/29/2023    CL 97 (L) 11/29/2023    CREATININE 1.39 (H) 11/29/2023    GLUCOSE 145 (H) 11/29/2023    ALKPHOS 63 11/29/2023    K 4.9 11/29/2023    PROT 7.1 11/29/2023     (L) 11/29/2023    AST 15 11/29/2023    ALT 12 11/29/2023    BUN 39 (H) 11/29/2023    ANIONGAP 16 11/29/2023    ALBUMIN 4.2 11/29/2023    GFRF 43 (A) 01/16/2023     Lab Results   Component Value Date    TRIG 211 (H) 11/29/2023    CHOL 153 11/29/2023    LDLCALC 65 11/29/2023    HDL 45.5 11/29/2023     Lab Results   Component Value Date    HGBA1C 8.3 (A) 08/01/2024     The ASCVD Risk score (Eliecer DK, et al., 2019) failed to calculate for the following reasons:    The 2019 ASCVD risk score is only valid for ages 40 to 79    Assessment/Plan   Reviewed medications with patient. Patient's metformin was recently increased from 1,000 mg/ day to 2,000 mg/ day. Patient's most recent GFR shows kidney function can not support increase. Recommend patient  decrease metformin back to 1,000 mg/ day until patient gets repeat comprehensive metabolic panel. If GFR above 45 at that time, will reconsider increasing metformin. Due to low kidney function, discussed starting Jardiance to help with preserving kidney function. Test claim showed estimated copay of $30/ month. Patient reports that copay would be a stretch for her monthly budget. Discussed  PAP, which patient seems to qualify for. Patient to send 1040 tax form, will follow up in 1 week with  PAP determination.     Patient Assistance Screening (VAF)    Patient verbally reports monthly or yearly income which is less than 400% federal poverty level     Application for program has been submitted for the following medications: Jardiance    Patient has been informed that program team will be reaching out to them to discuss necessary documentation, instructed to answer phone/return voicemail.     Patient aware this process may take up to 6 weeks.     If approved medication must be filled through [facility] pharmacy and may be picked up or mailed to patient.     Jardiance Education:     - Counseled patient on Jardiance MOA, expectations, side effects, duration of therapy, administration, and monitoring parameters.  - Reviewed the benefits of SGLT-2i therapy, such as glycemic control and kidney and CV protection.  - Advised patient to practice proper  hygiene to reduce risk of UTIs or yeast infections.  - Advised patient to maintain adequate fluid intake to remain hydrated while on SGLT2i therapy.  - Answered all patient questions and concerns.    Type 2 diabetes mellitus, is not at goal. <7.5%    DECREASE metformin to 1,000 mg daily  Follow up: I recommend diabetes care be 1 week.    Xiomara Luke, PharmD    Continue all meds under the continuation of care with the referring provider and clinical pharmacy team

## 2024-08-28 PROCEDURE — RXMED WILLOW AMBULATORY MEDICATION CHARGE

## 2024-08-29 ENCOUNTER — PHARMACY VISIT (OUTPATIENT)
Dept: PHARMACY | Facility: CLINIC | Age: 84
End: 2024-08-29
Payer: COMMERCIAL

## 2024-08-30 ENCOUNTER — TELEPHONE (OUTPATIENT)
Dept: PRIMARY CARE | Facility: CLINIC | Age: 84
End: 2024-08-30
Payer: MEDICARE

## 2024-08-30 NOTE — TELEPHONE ENCOUNTER
----- Message from Cate Rojas sent at 8/29/2024 12:06 AM EDT -----  Regarding: Labs  Please call patient and remind her to please have her fasting labs drawn that were ordered at her 8/1/24 appointment.  ----- Message -----  From: Xiomara Luke PharmD  Sent: 8/28/2024  10:06 AM EDT  To: Cate Rojas, GARY-CNP

## 2024-09-03 ENCOUNTER — APPOINTMENT (OUTPATIENT)
Dept: PHARMACY | Facility: HOSPITAL | Age: 84
End: 2024-09-03
Payer: MEDICARE

## 2024-09-03 DIAGNOSIS — E11.22 TYPE 2 DIABETES MELLITUS WITH STAGE 3B CHRONIC KIDNEY DISEASE, WITHOUT LONG-TERM CURRENT USE OF INSULIN (MULTI): ICD-10-CM

## 2024-09-03 DIAGNOSIS — N18.32 TYPE 2 DIABETES MELLITUS WITH STAGE 3B CHRONIC KIDNEY DISEASE, WITHOUT LONG-TERM CURRENT USE OF INSULIN (MULTI): ICD-10-CM

## 2024-09-04 ENCOUNTER — APPOINTMENT (OUTPATIENT)
Dept: LAB | Facility: LAB | Age: 84
End: 2024-09-04
Payer: MEDICARE

## 2024-09-24 ENCOUNTER — APPOINTMENT (OUTPATIENT)
Dept: PHARMACY | Facility: HOSPITAL | Age: 84
End: 2024-09-24
Payer: MEDICARE

## 2024-09-24 DIAGNOSIS — N18.32 TYPE 2 DIABETES MELLITUS WITH STAGE 3B CHRONIC KIDNEY DISEASE, WITHOUT LONG-TERM CURRENT USE OF INSULIN (MULTI): ICD-10-CM

## 2024-09-24 DIAGNOSIS — E11.22 TYPE 2 DIABETES MELLITUS WITH STAGE 3B CHRONIC KIDNEY DISEASE, WITHOUT LONG-TERM CURRENT USE OF INSULIN (MULTI): ICD-10-CM

## 2024-09-24 NOTE — PROGRESS NOTES
Subjective     Patient ID: Meghna Escobedo is a 84 y.o. female who presents for diabetes management.    Diabetes  She presents for her follow-up diabetic visit. She has type 2 diabetes mellitus. Risk factors for coronary artery disease include diabetes mellitus, hypertension and dyslipidemia. Current diabetic treatment includes oral agent (monotherapy). An ACE inhibitor/angiotensin II receptor blocker is being taken.     No Known Allergies    Objective     Current DM Pharmacotherapy:   -metformin 1,000 mg 1 po bid  -Jardiance 10 mg daily    SECONDARY PREVENTION  - Statin? Yes  - ACE-I/ARB? Yes  - Aspirin? No    Current monitoring regimen:   Patient is using: glucometer    SMBG Readings: none to report    Any episodes of hypoglycemia? No  Hypoglycemia awareness? No    Pertinent PMH Review:  - PMH of Pancreatitis: No  - PMH/FH of Medullary Thyroid Cancer: No  - PMH of Retinopathy: No  - PMH of Urinary Tract Infections: No    Lab Review  Lab Results   Component Value Date    BILITOT 0.3 11/29/2023    CALCIUM 9.6 11/29/2023    CO2 27 11/29/2023    CL 97 (L) 11/29/2023    CREATININE 1.39 (H) 11/29/2023    GLUCOSE 145 (H) 11/29/2023    ALKPHOS 63 11/29/2023    K 4.9 11/29/2023    PROT 7.1 11/29/2023     (L) 11/29/2023    AST 15 11/29/2023    ALT 12 11/29/2023    BUN 39 (H) 11/29/2023    ANIONGAP 16 11/29/2023    ALBUMIN 4.2 11/29/2023    GFRF 43 (A) 01/16/2023     Lab Results   Component Value Date    TRIG 211 (H) 11/29/2023    CHOL 153 11/29/2023    LDLCALC 65 11/29/2023    HDL 45.5 11/29/2023     Lab Results   Component Value Date    HGBA1C 8.3 (A) 08/01/2024     The ASCVD Risk score (Eliecer JETER, et al., 2019) failed to calculate for the following reasons:    The 2019 ASCVD risk score is only valid for ages 40 to 79    Assessment/Plan   Patient had no issues with starting Jardiance. Has not tested her BG since starting medication. Will increase Jardiance to 25 mg daily and will follow up in 4 weeks for dose  tolerability.    Type 2 diabetes mellitus, is not at goal. <7.5%    INCREASE Jardiance to 25 mg daily  Follow up: I recommend diabetes care be 4 weeks.    Xiomara Luke, PharmD    Continue all meds under the continuation of care with the referring provider and clinical pharmacy team

## 2024-10-01 ENCOUNTER — PHARMACY VISIT (OUTPATIENT)
Dept: PHARMACY | Facility: CLINIC | Age: 84
End: 2024-10-01
Payer: COMMERCIAL

## 2024-10-01 PROCEDURE — RXMED WILLOW AMBULATORY MEDICATION CHARGE

## 2024-10-22 ENCOUNTER — APPOINTMENT (OUTPATIENT)
Dept: PHARMACY | Facility: HOSPITAL | Age: 84
End: 2024-10-22
Payer: MEDICARE

## 2024-10-22 DIAGNOSIS — N18.32 TYPE 2 DIABETES MELLITUS WITH STAGE 3B CHRONIC KIDNEY DISEASE, WITHOUT LONG-TERM CURRENT USE OF INSULIN (MULTI): ICD-10-CM

## 2024-10-22 DIAGNOSIS — E11.22 TYPE 2 DIABETES MELLITUS WITH STAGE 3B CHRONIC KIDNEY DISEASE, WITHOUT LONG-TERM CURRENT USE OF INSULIN (MULTI): ICD-10-CM

## 2024-11-04 PROCEDURE — RXMED WILLOW AMBULATORY MEDICATION CHARGE

## 2024-11-05 ENCOUNTER — PHARMACY VISIT (OUTPATIENT)
Dept: PHARMACY | Facility: CLINIC | Age: 84
End: 2024-11-05
Payer: COMMERCIAL

## 2024-11-19 ENCOUNTER — APPOINTMENT (OUTPATIENT)
Dept: PHARMACY | Facility: HOSPITAL | Age: 84
End: 2024-11-19
Payer: MEDICARE

## 2024-11-19 DIAGNOSIS — I10 HTN (HYPERTENSION), BENIGN: ICD-10-CM

## 2024-11-19 DIAGNOSIS — N18.32 TYPE 2 DIABETES MELLITUS WITH STAGE 3B CHRONIC KIDNEY DISEASE, WITHOUT LONG-TERM CURRENT USE OF INSULIN (MULTI): ICD-10-CM

## 2024-11-19 DIAGNOSIS — E11.22 TYPE 2 DIABETES MELLITUS WITH STAGE 3B CHRONIC KIDNEY DISEASE, WITHOUT LONG-TERM CURRENT USE OF INSULIN (MULTI): ICD-10-CM

## 2024-11-19 DIAGNOSIS — E78.1 HYPERTRIGLYCERIDEMIA: ICD-10-CM

## 2024-11-19 DIAGNOSIS — E78.5 HYPERLIPIDEMIA, UNSPECIFIED HYPERLIPIDEMIA TYPE: ICD-10-CM

## 2024-11-20 RX ORDER — FENOFIBRATE 48 MG/1
48 TABLET, FILM COATED ORAL DAILY
Qty: 90 TABLET | Refills: 3 | Status: SHIPPED | OUTPATIENT
Start: 2024-11-20

## 2024-11-20 RX ORDER — CHLORTHALIDONE 25 MG/1
25 TABLET ORAL DAILY
Qty: 90 TABLET | Refills: 3 | Status: SHIPPED | OUTPATIENT
Start: 2024-11-20

## 2024-11-20 NOTE — TELEPHONE ENCOUNTER
Pharmacy requests prescription below    Last Office Visit: 8/1/2024   Next Office Visit: Visit date not found     Requested Prescriptions     Pending Prescriptions Disp Refills    fenofibrate (Tricor) 48 mg tablet [Pharmacy Med Name: FENOFIBRATE TABS 48MG] 90 tablet 3     Sig: TAKE 1 TABLET DAILY    chlorthalidone (Hygroton) 25 mg tablet [Pharmacy Med Name: CHLORTHALIDONE TABS 25MG] 90 tablet 3     Sig: TAKE 1 TABLET DAILY

## 2024-11-25 NOTE — PROGRESS NOTES
Subjective     Patient ID: Meghna Escobedo is a 84 y.o. female who presents for diabetes management.    Diabetes  She presents for her follow-up diabetic visit. She has type 2 diabetes mellitus. Risk factors for coronary artery disease include diabetes mellitus, hypertension and dyslipidemia. Current diabetic treatment includes oral agent (monotherapy). An ACE inhibitor/angiotensin II receptor blocker is being taken.     No Known Allergies    Objective     Current DM Pharmacotherapy:   -metformin 1,000 mg 1 po bid  -Jardiance 25 mg daily    SECONDARY PREVENTION  - Statin? Yes  - ACE-I/ARB? Yes  - Aspirin? No    Current monitoring regimen:   Patient is using: glucometer    SMBG Readings: 171, 211, 165, 183, 182, 183    Any episodes of hypoglycemia? No  Hypoglycemia awareness? No    Pertinent PMH Review:  - PMH of Pancreatitis: No  - PMH/FH of Medullary Thyroid Cancer: No  - PMH of Retinopathy: No  - PMH of Urinary Tract Infections: No    Lab Review  Lab Results   Component Value Date    BILITOT 0.3 11/29/2023    CALCIUM 9.6 11/29/2023    CO2 27 11/29/2023    CL 97 (L) 11/29/2023    CREATININE 1.39 (H) 11/29/2023    GLUCOSE 145 (H) 11/29/2023    ALKPHOS 63 11/29/2023    K 4.9 11/29/2023    PROT 7.1 11/29/2023     (L) 11/29/2023    AST 15 11/29/2023    ALT 12 11/29/2023    BUN 39 (H) 11/29/2023    ANIONGAP 16 11/29/2023    ALBUMIN 4.2 11/29/2023    GFRF 43 (A) 01/16/2023     Lab Results   Component Value Date    TRIG 211 (H) 11/29/2023    CHOL 153 11/29/2023    LDLCALC 65 11/29/2023    HDL 45.5 11/29/2023     Lab Results   Component Value Date    HGBA1C 8.3 (A) 08/01/2024     The ASCVD Risk score (Eliecer DK, et al., 2019) failed to calculate for the following reasons:    The 2019 ASCVD risk score is only valid for ages 40 to 79    Assessment/Plan   Patient had no issues with starting Jardiance. Patient's morning BG readings were 171, 211, 165, 183, 182, 183. BG is still higher then patient's fasting goal, but  patient is hesitant to add on anymore new medication at this time. Will continue Jardiance 25 mg daily and will follow up in 4 weeks for dose tolerability and FBG readings.    Type 2 diabetes mellitus, is not at goal. <8%    CONTINUE Jardiance 25 mg daily  Follow up: I recommend diabetes care be 4 weeks.    Xiomara Luke, PharmD    Continue all meds under the continuation of care with the referring provider and clinical pharmacy team

## 2024-11-28 PROCEDURE — RXMED WILLOW AMBULATORY MEDICATION CHARGE

## 2024-12-02 ENCOUNTER — PHARMACY VISIT (OUTPATIENT)
Dept: PHARMACY | Facility: CLINIC | Age: 84
End: 2024-12-02
Payer: COMMERCIAL

## 2024-12-17 ENCOUNTER — APPOINTMENT (OUTPATIENT)
Dept: PHARMACY | Facility: HOSPITAL | Age: 84
End: 2024-12-17
Payer: MEDICARE

## 2024-12-20 ENCOUNTER — OFFICE VISIT (OUTPATIENT)
Dept: PRIMARY CARE | Facility: CLINIC | Age: 84
End: 2024-12-20
Payer: MEDICARE

## 2024-12-20 ENCOUNTER — LAB (OUTPATIENT)
Dept: LAB | Facility: LAB | Age: 84
End: 2024-12-20
Payer: MEDICARE

## 2024-12-20 VITALS
HEART RATE: 65 BPM | RESPIRATION RATE: 18 BRPM | BODY MASS INDEX: 25.55 KG/M2 | HEIGHT: 66 IN | WEIGHT: 159 LBS | TEMPERATURE: 97.3 F | SYSTOLIC BLOOD PRESSURE: 130 MMHG | DIASTOLIC BLOOD PRESSURE: 75 MMHG | OXYGEN SATURATION: 95 %

## 2024-12-20 DIAGNOSIS — E78.5 HYPERLIPIDEMIA, UNSPECIFIED HYPERLIPIDEMIA TYPE: ICD-10-CM

## 2024-12-20 DIAGNOSIS — N18.31 STAGE 3A CHRONIC KIDNEY DISEASE (MULTI): ICD-10-CM

## 2024-12-20 DIAGNOSIS — M85.852 OSTEOPENIA OF NECK OF LEFT FEMUR: ICD-10-CM

## 2024-12-20 DIAGNOSIS — E11.9 TYPE 2 DIABETES MELLITUS WITHOUT COMPLICATION, WITHOUT LONG-TERM CURRENT USE OF INSULIN (MULTI): ICD-10-CM

## 2024-12-20 DIAGNOSIS — K21.9 GASTROESOPHAGEAL REFLUX DISEASE WITHOUT ESOPHAGITIS: ICD-10-CM

## 2024-12-20 DIAGNOSIS — Z00.00 WELLNESS EXAMINATION: ICD-10-CM

## 2024-12-20 DIAGNOSIS — Z00.00 WELLNESS EXAMINATION: Primary | ICD-10-CM

## 2024-12-20 DIAGNOSIS — D50.9 IRON DEFICIENCY ANEMIA, UNSPECIFIED IRON DEFICIENCY ANEMIA TYPE: ICD-10-CM

## 2024-12-20 DIAGNOSIS — I10 HTN (HYPERTENSION), BENIGN: Primary | ICD-10-CM

## 2024-12-20 DIAGNOSIS — I50.32 CHRONIC DIASTOLIC (CONGESTIVE) HEART FAILURE: ICD-10-CM

## 2024-12-20 LAB
25(OH)D3 SERPL-MCNC: 61 NG/ML (ref 30–100)
ALBUMIN SERPL BCP-MCNC: 4 G/DL (ref 3.4–5)
ALP SERPL-CCNC: 53 U/L (ref 33–136)
ALT SERPL W P-5'-P-CCNC: 8 U/L (ref 7–45)
ANION GAP SERPL CALC-SCNC: 15 MMOL/L (ref 10–20)
AST SERPL W P-5'-P-CCNC: 12 U/L (ref 9–39)
BILIRUB SERPL-MCNC: 0.3 MG/DL (ref 0–1.2)
BUN SERPL-MCNC: 51 MG/DL (ref 6–23)
CALCIUM SERPL-MCNC: 9.4 MG/DL (ref 8.6–10.3)
CHLORIDE SERPL-SCNC: 103 MMOL/L (ref 98–107)
CHOLEST SERPL-MCNC: 162 MG/DL (ref 0–199)
CHOLESTEROL/HDL RATIO: 3.6
CO2 SERPL-SCNC: 28 MMOL/L (ref 21–32)
CREAT SERPL-MCNC: 1.98 MG/DL (ref 0.5–1.05)
EGFRCR SERPLBLD CKD-EPI 2021: 24 ML/MIN/1.73M*2
ERYTHROCYTE [DISTWIDTH] IN BLOOD BY AUTOMATED COUNT: 13.2 % (ref 11.5–14.5)
EST. AVERAGE GLUCOSE BLD GHB EST-MCNC: 171 MG/DL
GLUCOSE SERPL-MCNC: 147 MG/DL (ref 74–99)
HBA1C MFR BLD: 7.6 %
HCT VFR BLD AUTO: 39.3 % (ref 36–46)
HDLC SERPL-MCNC: 45.4 MG/DL
HGB BLD-MCNC: 12.1 G/DL (ref 12–16)
LDLC SERPL CALC-MCNC: 89 MG/DL
MCH RBC QN AUTO: 27.5 PG (ref 26–34)
MCHC RBC AUTO-ENTMCNC: 30.8 G/DL (ref 32–36)
MCV RBC AUTO: 89 FL (ref 80–100)
NON HDL CHOLESTEROL: 117 MG/DL (ref 0–149)
NRBC BLD-RTO: 0 /100 WBCS (ref 0–0)
PLATELET # BLD AUTO: 308 X10*3/UL (ref 150–450)
POTASSIUM SERPL-SCNC: 4.8 MMOL/L (ref 3.5–5.3)
PROT SERPL-MCNC: 6.5 G/DL (ref 6.4–8.2)
RBC # BLD AUTO: 4.4 X10*6/UL (ref 4–5.2)
SODIUM SERPL-SCNC: 141 MMOL/L (ref 136–145)
TRIGL SERPL-MCNC: 139 MG/DL (ref 0–149)
TSH SERPL-ACNC: 0.84 MIU/L (ref 0.44–3.98)
VIT B12 SERPL-MCNC: 1071 PG/ML (ref 211–911)
VLDL: 28 MG/DL (ref 0–40)
WBC # BLD AUTO: 8 X10*3/UL (ref 4.4–11.3)

## 2024-12-20 PROCEDURE — 84443 ASSAY THYROID STIM HORMONE: CPT

## 2024-12-20 PROCEDURE — 83036 HEMOGLOBIN GLYCOSYLATED A1C: CPT

## 2024-12-20 PROCEDURE — 82306 VITAMIN D 25 HYDROXY: CPT

## 2024-12-20 PROCEDURE — 85027 COMPLETE CBC AUTOMATED: CPT

## 2024-12-20 PROCEDURE — 82607 VITAMIN B-12: CPT

## 2024-12-20 PROCEDURE — 99214 OFFICE O/P EST MOD 30 MIN: CPT | Performed by: NURSE PRACTITIONER

## 2024-12-20 PROCEDURE — 36415 COLL VENOUS BLD VENIPUNCTURE: CPT

## 2024-12-20 PROCEDURE — 80061 LIPID PANEL: CPT

## 2024-12-20 PROCEDURE — 80053 COMPREHEN METABOLIC PANEL: CPT

## 2024-12-20 RX ORDER — OMEPRAZOLE 20 MG/1
20 CAPSULE, DELAYED RELEASE ORAL
Qty: 90 CAPSULE | Refills: 3 | Status: SHIPPED | OUTPATIENT
Start: 2024-12-20 | End: 2025-12-20

## 2024-12-20 RX ORDER — ACETAMINOPHEN 325 MG/1
650 TABLET ORAL
COMMUNITY
Start: 2020-10-09

## 2024-12-20 RX ORDER — LISINOPRIL 20 MG/1
20 TABLET ORAL DAILY
Qty: 100 TABLET | Refills: 3 | Status: SHIPPED | OUTPATIENT
Start: 2024-12-20 | End: 2026-01-24

## 2024-12-20 RX ORDER — LISINOPRIL 20 MG/1
20 TABLET ORAL DAILY
Qty: 100 TABLET | Refills: 3 | Status: SHIPPED | OUTPATIENT
Start: 2024-12-20 | End: 2024-12-20

## 2024-12-20 ASSESSMENT — ENCOUNTER SYMPTOMS
NAUSEA: 0
VOMITING: 0
DYSPHORIC MOOD: 0
ABDOMINAL PAIN: 0
NERVOUS/ANXIOUS: 0
BACK PAIN: 0
NECK PAIN: 1
CONSTIPATION: 0
SHORTNESS OF BREATH: 1
LIGHT-HEADEDNESS: 1
PALPITATIONS: 0
DIARRHEA: 0
SLEEP DISTURBANCE: 0
FATIGUE: 1

## 2024-12-20 NOTE — PROGRESS NOTES
Subjective   Meghna Escobedo is a 84 y.o. female who presents for Follow-up (6 month follow-up. Patient has active labs from August in chart. She has been fasting and will have labs drawn after her visit today. ).    HPI  Review of Systems   Constitutional:  Positive for fatigue (chronic).   HENT:  Negative for congestion.    Respiratory:  Positive for shortness of breath (with exertion, unchanged).    Cardiovascular:  Negative for chest pain, palpitations and leg swelling.   Gastrointestinal:  Negative for abdominal pain, constipation, diarrhea, nausea and vomiting.   Musculoskeletal:  Positive for neck pain. Negative for back pain.   Neurological:  Positive for light-headedness (occasional, even while sitting).   Psychiatric/Behavioral:  Negative for dysphoric mood and sleep disturbance. The patient is not nervous/anxious.    Objective     Physical Exam  Vitals reviewed.   Constitutional:       Appearance: Normal appearance.   HENT:      Head: Normocephalic.      Right Ear: Tympanic membrane normal.      Left Ear: Tympanic membrane normal.   Eyes:      Conjunctiva/sclera: Conjunctivae normal.   Cardiovascular:      Rate and Rhythm: Normal rate and regular rhythm.      Pulses: Normal pulses.      Heart sounds: No murmur heard.  Pulmonary:      Effort: Pulmonary effort is normal.      Breath sounds: Normal breath sounds.   Abdominal:      General: Bowel sounds are normal.      Palpations: Abdomen is soft.   Musculoskeletal:      Cervical back: Neck supple.      Right lower leg: No edema.      Left lower leg: No edema.      Comments: Uses a walker or a cane outside of the house. Does not use an assistive device in her own home. Reports no falls since last visit, but had a near fall in her home once, but caught herself and sat on nearby chair.   Skin:     General: Skin is warm and dry.      Findings: No rash.   Neurological:      General: No focal deficit present.      Mental Status: She is alert and oriented to  "person, place, and time.   Psychiatric:         Mood and Affect: Mood normal.         Thought Content: Thought content normal.       /75   Pulse 65   Temp 36.3 °C (97.3 °F)   Resp 18   Ht 1.676 m (5' 6\")   Wt 72.1 kg (159 lb)   SpO2 95%   BMI 25.66 kg/m²     Assessment/Plan     Problem List Items Addressed This Visit       HTN (hypertension), benign - Primary    Current Assessment & Plan     Patient had redundant thiazides on her med list. discontinue the benazapril-hydrochlorothiazide, continue chlorthalidone 25 mg, and add lisinopril 20 mg daily. Pt will check home BP at least weekly.            Relevant Medications    lisinopril 20 mg tablet    Other Relevant Orders    Follow Up In [facility] - PCP - [facility]    Follow Up In [facility] - PCP - Medicare Annual    GERD (gastroesophageal reflux disease)    Relevant Medications    omeprazole (PriLOSEC) 20 mg DR capsule    Hyperlipidemia    Current Assessment & Plan     Controlled. Will continue current treatment plan.  Will repeat lipid panel today.          Type 2 diabetes mellitus    Current Assessment & Plan     Repeat A1C. Follows w/ Leslie CraigD. Controlled. Will continue current treatment plan.           Chronic diastolic (congestive) heart failure    Current Assessment & Plan     Currently euvolemic. Continue current regimen.            "

## 2024-12-20 NOTE — ASSESSMENT & PLAN NOTE
Patient had redundant thiazides on her med list. discontinue the benazapril-hydrochlorothiazide, continue chlorthalidone 25 mg, and add lisinopril 20 mg daily. Pt will check home BP at least weekly.

## 2024-12-24 ENCOUNTER — APPOINTMENT (OUTPATIENT)
Dept: PHARMACY | Facility: HOSPITAL | Age: 84
End: 2024-12-24
Payer: MEDICARE

## 2024-12-24 DIAGNOSIS — N18.32 TYPE 2 DIABETES MELLITUS WITH STAGE 3B CHRONIC KIDNEY DISEASE, WITHOUT LONG-TERM CURRENT USE OF INSULIN (MULTI): ICD-10-CM

## 2024-12-24 DIAGNOSIS — E11.22 TYPE 2 DIABETES MELLITUS WITH STAGE 3B CHRONIC KIDNEY DISEASE, WITHOUT LONG-TERM CURRENT USE OF INSULIN (MULTI): ICD-10-CM

## 2024-12-28 PROCEDURE — RXMED WILLOW AMBULATORY MEDICATION CHARGE

## 2024-12-30 ENCOUNTER — PHARMACY VISIT (OUTPATIENT)
Dept: PHARMACY | Facility: CLINIC | Age: 84
End: 2024-12-30
Payer: COMMERCIAL

## 2024-12-30 DIAGNOSIS — N18.4 CKD (CHRONIC KIDNEY DISEASE), STAGE IV (MULTI): Primary | ICD-10-CM

## 2024-12-30 NOTE — PROGRESS NOTES
Subjective     Patient ID: Meghna Escobedo is a 84 y.o. female who presents for diabetes management.    Diabetes  She presents for her follow-up diabetic visit. She has type 2 diabetes mellitus. Risk factors for coronary artery disease include diabetes mellitus, hypertension and dyslipidemia. Current diabetic treatment includes oral agent (monotherapy). An ACE inhibitor/angiotensin II receptor blocker is being taken.     No Known Allergies    Objective     Current DM Pharmacotherapy:   -metformin 1,000 mg 1 po bid  -Jardiance 25 mg daily    SECONDARY PREVENTION  - Statin? Yes  - ACE-I/ARB? Yes  - Aspirin? No    Current monitoring regimen:   Patient is using: glucometer    SMBG Readings: 171, 211, 165, 183, 182, 183    Any episodes of hypoglycemia? No  Hypoglycemia awareness? No    Pertinent PMH Review:  - PMH of Pancreatitis: No  - PMH/FH of Medullary Thyroid Cancer: No  - PMH of Retinopathy: No  - PMH of Urinary Tract Infections: No    Lab Review  Lab Results   Component Value Date    BILITOT 0.3 12/20/2024    CALCIUM 9.4 12/20/2024    CO2 28 12/20/2024     12/20/2024    CREATININE 1.98 (H) 12/20/2024    GLUCOSE 147 (H) 12/20/2024    ALKPHOS 53 12/20/2024    K 4.8 12/20/2024    PROT 6.5 12/20/2024     12/20/2024    AST 12 12/20/2024    ALT 8 12/20/2024    BUN 51 (H) 12/20/2024    ANIONGAP 15 12/20/2024    ALBUMIN 4.0 12/20/2024    GFRF 43 (A) 01/16/2023     Lab Results   Component Value Date    TRIG 139 12/20/2024    CHOL 162 12/20/2024    LDLCALC 89 12/20/2024    HDL 45.4 12/20/2024     Lab Results   Component Value Date    HGBA1C 7.6 (H) 12/20/2024     The ASCVD Risk score (Eliecer JETER, et al., 2019) failed to calculate for the following reasons:    The 2019 ASCVD risk score is only valid for ages 40 to 79    Assessment/Plan   Patient had no issues with Jardiance. A1C is now in goal at 7.6%. Kidney function decreased since last visit. Will follow up in 8 to review kidney function.    Type 2 diabetes  mellitus, is at goal. <8%    CONTINUE Jardiance 25 mg daily  Follow up: I recommend diabetes care be 8 weeks.    Xiomara Luke, PharmD    Continue all meds under the continuation of care with the referring provider and clinical pharmacy team

## 2024-12-31 ENCOUNTER — TELEPHONE (OUTPATIENT)
Dept: PRIMARY CARE | Facility: CLINIC | Age: 84
End: 2024-12-31
Payer: MEDICARE

## 2024-12-31 DIAGNOSIS — I10 HTN (HYPERTENSION), BENIGN: ICD-10-CM

## 2024-12-31 NOTE — TELEPHONE ENCOUNTER
PT needs a refill for...    lisinopril 20 mg tablet     Please send to...    [facility]    Thank you

## 2025-01-10 ENCOUNTER — APPOINTMENT (OUTPATIENT)
Dept: NEPHROLOGY | Facility: CLINIC | Age: 85
End: 2025-01-10
Payer: MEDICARE

## 2025-01-10 ENCOUNTER — LAB (OUTPATIENT)
Dept: LAB | Facility: LAB | Age: 85
End: 2025-01-10
Payer: MEDICARE

## 2025-01-10 VITALS
SYSTOLIC BLOOD PRESSURE: 165 MMHG | HEART RATE: 77 BPM | HEIGHT: 66 IN | BODY MASS INDEX: 24.53 KG/M2 | WEIGHT: 152.6 LBS | DIASTOLIC BLOOD PRESSURE: 86 MMHG

## 2025-01-10 DIAGNOSIS — N18.31 STAGE 3A CHRONIC KIDNEY DISEASE (MULTI): ICD-10-CM

## 2025-01-10 DIAGNOSIS — I10 HTN (HYPERTENSION), BENIGN: ICD-10-CM

## 2025-01-10 DIAGNOSIS — N18.32 TYPE 2 DIABETES MELLITUS WITH STAGE 3B CHRONIC KIDNEY DISEASE, WITHOUT LONG-TERM CURRENT USE OF INSULIN (MULTI): ICD-10-CM

## 2025-01-10 DIAGNOSIS — N18.4 CKD (CHRONIC KIDNEY DISEASE), STAGE IV (MULTI): ICD-10-CM

## 2025-01-10 DIAGNOSIS — Z13.29 SCREENING FOR THYROID DISORDER: ICD-10-CM

## 2025-01-10 DIAGNOSIS — E08.22 DIABETES MELLITUS DUE TO UNDERLYING CONDITION WITH DIABETIC CHRONIC KIDNEY DISEASE, UNSPECIFIED CKD STAGE, UNSPECIFIED WHETHER LONG TERM INSULIN USE: ICD-10-CM

## 2025-01-10 DIAGNOSIS — E55.9 VITAMIN D DEFICIENCY: ICD-10-CM

## 2025-01-10 DIAGNOSIS — E11.22 TYPE 2 DIABETES MELLITUS WITH STAGE 3B CHRONIC KIDNEY DISEASE, WITHOUT LONG-TERM CURRENT USE OF INSULIN (MULTI): ICD-10-CM

## 2025-01-10 DIAGNOSIS — E08.22 DIABETES MELLITUS DUE TO UNDERLYING CONDITION WITH DIABETIC CHRONIC KIDNEY DISEASE, UNSPECIFIED CKD STAGE, UNSPECIFIED WHETHER LONG TERM INSULIN USE: Primary | ICD-10-CM

## 2025-01-10 DIAGNOSIS — E78.5 HYPERLIPIDEMIA, UNSPECIFIED HYPERLIPIDEMIA TYPE: ICD-10-CM

## 2025-01-10 DIAGNOSIS — E53.8 B12 DEFICIENCY: ICD-10-CM

## 2025-01-10 DIAGNOSIS — I10 ESSENTIAL HYPERTENSION: ICD-10-CM

## 2025-01-10 LAB
25(OH)D3 SERPL-MCNC: 64 NG/ML (ref 30–100)
ALBUMIN SERPL BCP-MCNC: 4.3 G/DL (ref 3.4–5)
ALP SERPL-CCNC: 50 U/L (ref 33–136)
ALT SERPL W P-5'-P-CCNC: 9 U/L (ref 7–45)
ANION GAP SERPL CALC-SCNC: 15 MMOL/L (ref 10–20)
AST SERPL W P-5'-P-CCNC: 14 U/L (ref 9–39)
BACTERIA #/AREA URNS AUTO: ABNORMAL /HPF
BILIRUB SERPL-MCNC: 0.4 MG/DL (ref 0–1.2)
BUN SERPL-MCNC: 48 MG/DL (ref 6–23)
CALCIUM SERPL-MCNC: 9.7 MG/DL (ref 8.6–10.3)
CHLORIDE SERPL-SCNC: 104 MMOL/L (ref 98–107)
CHOLEST SERPL-MCNC: 197 MG/DL (ref 0–199)
CHOLESTEROL/HDL RATIO: 3.9
CO2 SERPL-SCNC: 27 MMOL/L (ref 21–32)
CREAT SERPL-MCNC: 1.9 MG/DL (ref 0.5–1.05)
CREAT UR-MCNC: 75.7 MG/DL (ref 20–320)
EGFRCR SERPLBLD CKD-EPI 2021: 26 ML/MIN/1.73M*2
ERYTHROCYTE [DISTWIDTH] IN BLOOD BY AUTOMATED COUNT: 13.3 % (ref 11.5–14.5)
GLUCOSE SERPL-MCNC: 187 MG/DL (ref 74–99)
HCT VFR BLD AUTO: 42.3 % (ref 36–46)
HDLC SERPL-MCNC: 50.6 MG/DL
HGB BLD-MCNC: 12.7 G/DL (ref 12–16)
LDLC SERPL CALC-MCNC: 109 MG/DL
MCH RBC QN AUTO: 27.1 PG (ref 26–34)
MCHC RBC AUTO-ENTMCNC: 30 G/DL (ref 32–36)
MCV RBC AUTO: 90 FL (ref 80–100)
MUCOUS THREADS #/AREA URNS AUTO: ABNORMAL /LPF
NON HDL CHOLESTEROL: 146 MG/DL (ref 0–149)
NRBC BLD-RTO: 0 /100 WBCS (ref 0–0)
PLATELET # BLD AUTO: 327 X10*3/UL (ref 150–450)
POTASSIUM SERPL-SCNC: 5.2 MMOL/L (ref 3.5–5.3)
PROT SERPL-MCNC: 7 G/DL (ref 6.4–8.2)
PROT UR-ACNC: 15 MG/DL (ref 5–24)
PROT/CREAT UR: 0.2 MG/MG CREAT (ref 0–0.17)
RBC # BLD AUTO: 4.68 X10*6/UL (ref 4–5.2)
RBC #/AREA URNS AUTO: ABNORMAL /HPF
SODIUM SERPL-SCNC: 141 MMOL/L (ref 136–145)
SQUAMOUS #/AREA URNS AUTO: ABNORMAL /HPF
TRIGL SERPL-MCNC: 188 MG/DL (ref 0–149)
TSH SERPL-ACNC: 1.26 MIU/L (ref 0.44–3.98)
VIT B12 SERPL-MCNC: 651 PG/ML (ref 211–911)
VLDL: 38 MG/DL (ref 0–40)
WBC # BLD AUTO: 10.2 X10*3/UL (ref 4.4–11.3)
WBC #/AREA URNS AUTO: ABNORMAL /HPF
WBC CLUMPS #/AREA URNS AUTO: ABNORMAL /HPF

## 2025-01-10 PROCEDURE — 80053 COMPREHEN METABOLIC PANEL: CPT

## 2025-01-10 PROCEDURE — 80061 LIPID PANEL: CPT

## 2025-01-10 PROCEDURE — 83970 ASSAY OF PARATHORMONE: CPT

## 2025-01-10 PROCEDURE — 82306 VITAMIN D 25 HYDROXY: CPT

## 2025-01-10 PROCEDURE — 83036 HEMOGLOBIN GLYCOSYLATED A1C: CPT

## 2025-01-10 PROCEDURE — 3079F DIAST BP 80-89 MM HG: CPT | Performed by: INTERNAL MEDICINE

## 2025-01-10 PROCEDURE — 1159F MED LIST DOCD IN RCRD: CPT | Performed by: INTERNAL MEDICINE

## 2025-01-10 PROCEDURE — 84156 ASSAY OF PROTEIN URINE: CPT

## 2025-01-10 PROCEDURE — 85027 COMPLETE CBC AUTOMATED: CPT

## 2025-01-10 PROCEDURE — 1123F ACP DISCUSS/DSCN MKR DOCD: CPT | Performed by: INTERNAL MEDICINE

## 2025-01-10 PROCEDURE — 84443 ASSAY THYROID STIM HORMONE: CPT

## 2025-01-10 PROCEDURE — 82607 VITAMIN B-12: CPT

## 2025-01-10 PROCEDURE — 82652 VIT D 1 25-DIHYDROXY: CPT

## 2025-01-10 PROCEDURE — 81001 URINALYSIS AUTO W/SCOPE: CPT

## 2025-01-10 PROCEDURE — 99204 OFFICE O/P NEW MOD 45 MIN: CPT | Performed by: INTERNAL MEDICINE

## 2025-01-10 PROCEDURE — 1036F TOBACCO NON-USER: CPT | Performed by: INTERNAL MEDICINE

## 2025-01-10 PROCEDURE — 82570 ASSAY OF URINE CREATININE: CPT

## 2025-01-10 PROCEDURE — 36415 COLL VENOUS BLD VENIPUNCTURE: CPT

## 2025-01-10 PROCEDURE — 3077F SYST BP >= 140 MM HG: CPT | Performed by: INTERNAL MEDICINE

## 2025-01-10 NOTE — PROGRESS NOTES
Meghna Escobedo   84 y.o.      Vitals:    01/10/25 0950   Weight: 69.2 kg (152 lb 9.6 oz)      MRN/Room: [id number]/Room/bed info not found      History Of Present Illness  Meghna Escobedo is a 84 y.o. female presenting with high Cr level.     Past Medical History  She has a past medical history of Paroxysmal atrial fibrillation (Multi) (07/20/2023).    Surgical History  She has a past surgical history that includes Other surgical history (07/23/2019).     Social History  She reports that she has never smoked. She has never been exposed to tobacco smoke. She has never used smokeless tobacco. She reports current alcohol use. She reports that she does not use drugs.    Family History  No family history on file.     Allergies  Patient has no known allergies.      Meds:         Current Outpatient Medications   Medication Sig Dispense Refill    acetaminophen (Tylenol) 325 mg tablet 2 tablets (650 mg).      amLODIPine (Norvasc) 10 mg tablet Take 1 tablet (10 mg) by mouth once daily. 90 each 3    blood sugar diagnostic (True Metrix Glucose Test Strip) strip TEST blood sugar twice daily 300 strip 0    calcium carbonate-vitamin D3 (Calcium 600 + D,3,) 600 mg-10 mcg (400 unit) tablet Take 1 tablet by mouth once daily.      chlorthalidone (Hygroton) 25 mg tablet TAKE 1 TABLET DAILY 90 tablet 3    empagliflozin (Jardiance) 25 mg Take 1 tablet (25 mg) by mouth once daily. 90 tablet 3    fenofibrate (Tricor) 48 mg tablet TAKE 1 TABLET DAILY 90 tablet 3    lisinopril 20 mg tablet Take 1 tablet (20 mg) by mouth once daily. 100 tablet 3    metFORMIN (Glucophage) 1,000 mg tablet Take 1 tablet (1,000 mg) by mouth 2 times daily (morning and late afternoon). 180 tablet 3    omeprazole (PriLOSEC) 20 mg DR capsule Take 1 capsule (20 mg) by mouth once daily in the morning. Take before meals. 90 capsule 3    pravastatin (Pravachol) 80 mg tablet Take 1 tablet (80 mg) by mouth once daily. 90 tablet 3    meloxicam (Mobic) 15 mg tablet Take 1  tablet (15 mg) by mouth once daily. (Patient not taking: Reported on 1/10/2025) 90 tablet 3     No current facility-administered medications for this visit.         ROS:  The patient is awake and oriented. No dizziness or lightheadedness. No chills and no fever. No headaches. No nausea and no vomiting. No shortness of breath. No cough. No chest pain. No abdominal pain. No diarrhea. No hematemesis or hemoptysis. No hematuria. No rectal bleeding. No melena. No epistaxis. No urinary symptoms. No flank pain. No leg edema. No leg pain. No itching. Overall, the rest of the review of systems is also negative.  12 point review of systems otherwise negative as stated in HPI.        Physical Exam:        Vitals:    01/10/25 0950   BP: 165/86   Pulse: 77     General: The patient is awake, oriented, and is not in any distress.  Head and Neck: Normocephalic. No periorbital edema.  Eyes: Not icteric.   Respiratory: Symmetric chest expansion. No respiratory distress.  Skin: No maculopapular rash.  Musculoskeletal: No peripheral edema.  Neuro Exam: Speech is fluent. Moves extremities.        Blood Labs:  No results found for this or any previous visit (from the past 24 hours).   Lab Results   Component Value Date    GLUCOSE 147 (H) 12/20/2024    CALCIUM 9.4 12/20/2024     12/20/2024    K 4.8 12/20/2024    CO2 28 12/20/2024     12/20/2024    BUN 51 (H) 12/20/2024    CREATININE 1.98 (H) 12/20/2024       Imaging:        Assessment and Plan:  #1 high creatinine level.  The patient had recent blood work which showed creatinine level of 1.98.  She has history of hypertension and diabetes.  Her creatinine level back in November 2023 was 1.39.  Her diabetes is not under control.  I asked for a basic metabolic panel, PTH, 25-hydroxy vitamin D, microscopic urinalysis, spot urine protein to creatinine ratio, and a kidney ultrasound.    2.  Diabetes.  I asked for spot urine protein to creatinine ratio.    3.  Hypertension.  Blood  pressure is not controlled control.  If blood pressure stays high I will adjust her antihypertensive medications.    I will see her in about 2 to 3 weeks for follow-up    Catracho Landry MD  Senior Attending Physician  Director of Onco-Nephrology Program  Division of Nephrology & Hypertension  [facility]

## 2025-01-11 LAB
EST. AVERAGE GLUCOSE BLD GHB EST-MCNC: 174 MG/DL
HBA1C MFR BLD: 7.7 %
PTH-INTACT SERPL-MCNC: 54.5 PG/ML (ref 18.5–88)

## 2025-01-13 ENCOUNTER — TELEPHONE (OUTPATIENT)
Dept: NEPHROLOGY | Facility: CLINIC | Age: 85
End: 2025-01-13
Payer: MEDICARE

## 2025-01-13 LAB — 1,25(OH)2D SERPL-MCNC: 33.6 PG/ML (ref 19.9–79.3)

## 2025-01-13 NOTE — TELEPHONE ENCOUNTER
----- Message from Catracho Landry sent at 1/10/2025  1:23 PM EST -----  1.  No change in kidney function compared to the previous blood work but overall worse compared to last year.  2.  Urine test is suggestive for urinary tract infection.  Please find if she is symptomatic?

## 2025-01-15 ENCOUNTER — APPOINTMENT (OUTPATIENT)
Dept: NEPHROLOGY | Facility: CLINIC | Age: 85
End: 2025-01-15
Payer: MEDICARE

## 2025-01-17 ENCOUNTER — HOSPITAL ENCOUNTER (OUTPATIENT)
Dept: RADIOLOGY | Facility: CLINIC | Age: 85
Discharge: HOME | End: 2025-01-17
Payer: MEDICARE

## 2025-01-17 DIAGNOSIS — E08.22 DIABETES MELLITUS DUE TO UNDERLYING CONDITION WITH DIABETIC CHRONIC KIDNEY DISEASE, UNSPECIFIED CKD STAGE, UNSPECIFIED WHETHER LONG TERM INSULIN USE: ICD-10-CM

## 2025-01-17 DIAGNOSIS — N18.4 CKD (CHRONIC KIDNEY DISEASE), STAGE IV (MULTI): ICD-10-CM

## 2025-01-17 DIAGNOSIS — I10 ESSENTIAL HYPERTENSION: ICD-10-CM

## 2025-01-17 PROCEDURE — 76770 US EXAM ABDO BACK WALL COMP: CPT

## 2025-01-20 ENCOUNTER — TELEPHONE (OUTPATIENT)
Dept: NEPHROLOGY | Facility: CLINIC | Age: 85
End: 2025-01-20
Payer: MEDICARE

## 2025-01-20 NOTE — TELEPHONE ENCOUNTER
----- Message from Catracho Landry sent at 1/20/2025  3:34 AM EST -----  Small L kidney and cysts in both kidneys.

## 2025-01-27 PROCEDURE — RXMED WILLOW AMBULATORY MEDICATION CHARGE

## 2025-01-29 ENCOUNTER — PHARMACY VISIT (OUTPATIENT)
Dept: PHARMACY | Facility: CLINIC | Age: 85
End: 2025-01-29
Payer: COMMERCIAL

## 2025-01-29 DIAGNOSIS — I10 HTN (HYPERTENSION), BENIGN: ICD-10-CM

## 2025-01-29 RX ORDER — LISINOPRIL 20 MG/1
20 TABLET ORAL DAILY
Qty: 90 TABLET | Refills: 3 | Status: SHIPPED | OUTPATIENT
Start: 2025-01-29 | End: 2025-01-31 | Stop reason: ALTCHOICE

## 2025-01-29 NOTE — TELEPHONE ENCOUNTER
Pharmacy requests prescription below    Last Office Visit: 12/20/2024   Next Office Visit: 2/17/2025     Requested Prescriptions     Pending Prescriptions Disp Refills    lisinopril 20 mg tablet [Pharmacy Med Name: LISINOPRIL TABS 20MG] 90 tablet 3     Sig: TAKE 1 TABLET DAILY

## 2025-01-31 ENCOUNTER — APPOINTMENT (OUTPATIENT)
Dept: NEPHROLOGY | Facility: CLINIC | Age: 85
End: 2025-01-31
Payer: MEDICARE

## 2025-01-31 VITALS
HEART RATE: 70 BPM | SYSTOLIC BLOOD PRESSURE: 136 MMHG | HEIGHT: 66 IN | DIASTOLIC BLOOD PRESSURE: 68 MMHG | BODY MASS INDEX: 25.84 KG/M2 | WEIGHT: 160.8 LBS

## 2025-01-31 DIAGNOSIS — N18.4 CKD (CHRONIC KIDNEY DISEASE), STAGE IV (MULTI): Primary | ICD-10-CM

## 2025-01-31 DIAGNOSIS — R80.8 OTHER PROTEINURIA: ICD-10-CM

## 2025-01-31 DIAGNOSIS — E08.22 DIABETES MELLITUS DUE TO UNDERLYING CONDITION WITH DIABETIC CHRONIC KIDNEY DISEASE, UNSPECIFIED CKD STAGE, UNSPECIFIED WHETHER LONG TERM INSULIN USE: ICD-10-CM

## 2025-01-31 DIAGNOSIS — I10 ESSENTIAL HYPERTENSION: ICD-10-CM

## 2025-01-31 PROCEDURE — 1123F ACP DISCUSS/DSCN MKR DOCD: CPT | Performed by: INTERNAL MEDICINE

## 2025-01-31 PROCEDURE — 1036F TOBACCO NON-USER: CPT | Performed by: INTERNAL MEDICINE

## 2025-01-31 PROCEDURE — 3078F DIAST BP <80 MM HG: CPT | Performed by: INTERNAL MEDICINE

## 2025-01-31 PROCEDURE — 3075F SYST BP GE 130 - 139MM HG: CPT | Performed by: INTERNAL MEDICINE

## 2025-01-31 PROCEDURE — 1159F MED LIST DOCD IN RCRD: CPT | Performed by: INTERNAL MEDICINE

## 2025-01-31 PROCEDURE — 99214 OFFICE O/P EST MOD 30 MIN: CPT | Performed by: INTERNAL MEDICINE

## 2025-01-31 RX ORDER — LISINOPRIL 40 MG/1
40 TABLET ORAL DAILY
Qty: 90 TABLET | Refills: 3 | Status: SHIPPED | OUTPATIENT
Start: 2025-01-31 | End: 2026-01-31

## 2025-01-31 NOTE — PROGRESS NOTES
Meghna Doraxu   84 y.o.    @@  [facility]/Room: [id number]/Room/bed info not found    Subjective:   The patient is being seen for a routine clinic follow-up of chronic kidney disease. Recently, the disease has been stable. Disease complications:  No hyperkalemia, no hypocalcemia, no hyperphosphatemia, no metabolic acidosis, no coagulopathy, no uremic encephalopathy, no neuropathy and no renal osteodystrophy. The patient is currently asymptomatic. No associated symptoms are reported.       Meds:   Current Outpatient Medications   Medication Sig Dispense Refill    acetaminophen (Tylenol) 325 mg tablet 2 tablets (650 mg).      amLODIPine (Norvasc) 10 mg tablet Take 1 tablet (10 mg) by mouth once daily. 90 each 3    blood sugar diagnostic (True Metrix Glucose Test Strip) strip TEST blood sugar twice daily 300 strip 0    calcium carbonate-vitamin D3 (Calcium 600 + D,3,) 600 mg-10 mcg (400 unit) tablet Take 1 tablet by mouth once daily.      chlorthalidone (Hygroton) 25 mg tablet TAKE 1 TABLET DAILY 90 tablet 3    empagliflozin (Jardiance) 25 mg Take 1 tablet (25 mg) by mouth once daily. 90 tablet 3    fenofibrate (Tricor) 48 mg tablet TAKE 1 TABLET DAILY 90 tablet 3    metFORMIN (Glucophage) 1,000 mg tablet Take 1 tablet (1,000 mg) by mouth 2 times daily (morning and late afternoon). 180 tablet 3    omeprazole (PriLOSEC) 20 mg DR capsule Take 1 capsule (20 mg) by mouth once daily in the morning. Take before meals. 90 capsule 3    pravastatin (Pravachol) 80 mg tablet Take 1 tablet (80 mg) by mouth once daily. 90 tablet 3    lisinopril 40 mg tablet Take 1 tablet (40 mg) by mouth once daily. 90 tablet 3     No current facility-administered medications for this visit.          ROS:  The patient is awake and oriented. No dizziness or lightheadedness. No chills and no fever. No headaches. No nausea and no vomiting. No shortness of breath. No cough. No chest pain. No abdominal pain. No diarrhea. No hematemesis or hemoptysis. No  hematuria. No rectal bleeding. No melena. No epistaxis. No urinary symptoms. No flank pain. No leg edema. No itching. Overall, the rest of the review of systems is also negative.  12 point review of systems otherwise negative as stated in HPI.        Physical Examination:        Vitals:    01/31/25 1023   BP: 136/68   Pulse: 70     General: The patient is awake, oriented, and is not in any distress.  Head and Neck: Normocephalic. No periorbital edema.  Eyes: non-icteric  Respiratory: Symmetric chest expansion. No respiratory distress.  Skin: No maculopapular rash.  Musculoskeletal: No peripheral edema.  Neuro Exam: Speech is fluent. Moves extremities.    Imaging:  === 01/17/25 ===    US RENAL COMPLETE    - Impression -  Bilateral renal cortical cysts as detailed above.    No hydronephrosis bilaterally.    No shadowing calculus bilaterally.    Renal cortical thinning and increased echogenicity on the left.    The urinary bladder is poorly distended and not well evaluated on  this examination.      MACRO:  None    Signed by: Levi Hay 1/18/2025 9:05 AM  Dictation workstation:   [id number]       Blood Labs:  No results found for this or any previous visit (from the past 24 hours).   Lab Results   Component Value Date    PTH 54.5 01/10/2025      Lab Results   Component Value Date    GLUCOSE 187 (H) 01/10/2025    CALCIUM 9.7 01/10/2025     01/10/2025    K 5.2 01/10/2025    CO2 27 01/10/2025     01/10/2025    BUN 48 (H) 01/10/2025    CREATININE 1.90 (H) 01/10/2025         Assessment and Plan:  #1  Chronic kidney disease stage IV.  Last creatinine was 1.9.  Normal potassium and bicarb level.  Kidney ultrasound shows a small left kidney.  Volume status is good.  No microscopic hematuria.    2.  Proteinuria.  She has about 200 mg proteinuria.  This is because of diabetic nephropathy.  She is on Jardiance and lisinopril.  I increased her lisinopril dose which will also help with her high blood  pressure.     3.  Hypertension.  Blood pressure is not controlled control.  I increased lisinopril dose as mentioned above.    I will see her in about 4-month for follow-up          Catracho Landry MD  Senior Attending Physician  Director of Onco-Nephrology Program  Division of Nephrology & Hypertension  [facility]

## 2025-02-17 ENCOUNTER — APPOINTMENT (OUTPATIENT)
Dept: PRIMARY CARE | Facility: CLINIC | Age: 85
End: 2025-02-17
Payer: MEDICARE

## 2025-02-17 VITALS
BODY MASS INDEX: 25.5 KG/M2 | WEIGHT: 158 LBS | DIASTOLIC BLOOD PRESSURE: 64 MMHG | HEART RATE: 76 BPM | TEMPERATURE: 98 F | SYSTOLIC BLOOD PRESSURE: 144 MMHG

## 2025-02-17 DIAGNOSIS — I10 HTN (HYPERTENSION), BENIGN: Primary | ICD-10-CM

## 2025-02-17 DIAGNOSIS — N18.4 STAGE 4 CHRONIC KIDNEY DISEASE (MULTI): ICD-10-CM

## 2025-02-17 DIAGNOSIS — I50.32 CHRONIC DIASTOLIC (CONGESTIVE) HEART FAILURE: ICD-10-CM

## 2025-02-17 DIAGNOSIS — E11.9 TYPE 2 DIABETES MELLITUS WITHOUT COMPLICATION, WITHOUT LONG-TERM CURRENT USE OF INSULIN (MULTI): ICD-10-CM

## 2025-02-17 PROCEDURE — 99214 OFFICE O/P EST MOD 30 MIN: CPT | Performed by: NURSE PRACTITIONER

## 2025-02-17 ASSESSMENT — ENCOUNTER SYMPTOMS
DYSPHORIC MOOD: 0
DIZZINESS: 0
WHEEZING: 0
NERVOUS/ANXIOUS: 0
COUGH: 0
SLEEP DISTURBANCE: 1
APPETITE CHANGE: 0
HEADACHES: 0
SHORTNESS OF BREATH: 0
PALPITATIONS: 0
FATIGUE: 1
LIGHT-HEADEDNESS: 1
UNEXPECTED WEIGHT CHANGE: 0

## 2025-02-17 NOTE — PROGRESS NOTES
Subjective   Meghna Escobedo is a 84 y.o. female who presents for Follow-up (Follow up. Pts discontinued benazepril-hydrochlorothiazide and started lisinopril 20mg last visit. nephrologist has since increased lisinopril to 40mg. ).  HPI  Review of Systems   Constitutional:  Positive for fatigue. Negative for appetite change and unexpected weight change.   Respiratory:  Negative for cough, shortness of breath and wheezing.    Cardiovascular:  Negative for chest pain, palpitations and leg swelling.   Endocrine: Positive for cold intolerance (always cold).   Neurological:  Positive for light-headedness (sometimes while sitting still in [employer].). Negative for dizziness and headaches.   Psychiatric/Behavioral:  Positive for sleep disturbance (wakes up 3-4 times to void, but falls back to sleep easily.). Negative for dysphoric mood. The patient is not nervous/anxious.      Objective   Physical Exam  Vitals reviewed.   Constitutional:       General: She is not in acute distress.     Appearance: Normal appearance. She is not ill-appearing.   HENT:      Head: Normocephalic.   Eyes:      Conjunctiva/sclera: Conjunctivae normal.   Cardiovascular:      Rate and Rhythm: Normal rate and regular rhythm.      Pulses: Normal pulses.      Heart sounds: No murmur heard.  Pulmonary:      Effort: Pulmonary effort is normal.      Breath sounds: Normal breath sounds.   Abdominal:      General: Bowel sounds are normal.      Palpations: Abdomen is soft.   Musculoskeletal:      Cervical back: Neck supple.      Right lower leg: No edema.      Left lower leg: No edema.   Skin:     General: Skin is warm and dry.   Neurological:      General: No focal deficit present.      Mental Status: She is alert and oriented to person, place, and time.   Psychiatric:         Mood and Affect: Mood normal.         Thought Content: Thought content normal.       /64   Pulse 76   Temp 36.7 °C (98 °F)   Wt 71.7 kg (158 lb)   BMI 25.50 kg/m²    Assessment/Plan   Problem List Items Addressed This Visit       Stage 4 chronic kidney disease (Multi)    Overview     Follows w/ Dr. Landry         Current Assessment & Plan     F/U w/ Dr. Landry in 5/2025. Continue to avoid nephrotoxins.          HTN (hypertension), benign - Primary    Overview     Managed w/ chlorthalidone 25 mg, amlodipine 10 mg, and lisinopril 40 mg         Current Assessment & Plan     BP improved since her last visit in 12/2024. She is following w/ Dr. Landry for nephrology, and he increased her lisinopril from 20 mg to 40 mg in late January, 2024. Continue current regiment.          Type 2 diabetes mellitus    Overview     Managed w/ metformin 1000 mg BID and Jardiance 25 mg.  Takes statin (pravachol 80 mg)         Current Assessment & Plan     A1C 7.7% on 1/10/2025. Goal A1C for this pt is <8.0% given her advanced age. No reports of symptomatic hypoglycemia. Lowest recorded blood glucose was 76. Continue current meds. Continue to follow w/ PharmDXiomara.         Chronic diastolic (congestive) heart failure    Current Assessment & Plan     Currently euvolemic. Continue current regimen.

## 2025-02-17 NOTE — ASSESSMENT & PLAN NOTE
BP improved since her last visit in 12/2024. She is following w/ Dr. Landry for nephrology, and he increased her lisinopril from 20 mg to 40 mg in late January, 2024. Continue current regiment.

## 2025-02-17 NOTE — ASSESSMENT & PLAN NOTE
A1C 7.7% on 1/10/2025. Goal A1C for this pt is <8.0% given her advanced age. No reports of symptomatic hypoglycemia. Lowest recorded blood glucose was 76. Continue current meds. Continue to follow w/ LeslieDXiomara.

## 2025-02-25 ENCOUNTER — APPOINTMENT (OUTPATIENT)
Dept: PHARMACY | Facility: HOSPITAL | Age: 85
End: 2025-02-25
Payer: MEDICARE

## 2025-02-25 DIAGNOSIS — E11.22 TYPE 2 DIABETES MELLITUS WITH STAGE 3B CHRONIC KIDNEY DISEASE, WITHOUT LONG-TERM CURRENT USE OF INSULIN (MULTI): ICD-10-CM

## 2025-02-25 DIAGNOSIS — N18.32 TYPE 2 DIABETES MELLITUS WITH STAGE 3B CHRONIC KIDNEY DISEASE, WITHOUT LONG-TERM CURRENT USE OF INSULIN (MULTI): ICD-10-CM

## 2025-03-03 NOTE — PROGRESS NOTES
Subjective     Patient ID: Meghna Escobedo is a 84 y.o. female who presents for diabetes management.    Diabetes  She presents for her follow-up diabetic visit. She has type 2 diabetes mellitus. Risk factors for coronary artery disease include diabetes mellitus, hypertension and dyslipidemia. Current diabetic treatment includes oral agent (dual therapy). An ACE inhibitor/angiotensin II receptor blocker is being taken.     No Known Allergies    Objective     Current DM Pharmacotherapy:   -metformin 1,000 mg 1 po bid  -Jardiance 25 mg daily    SECONDARY PREVENTION  - Statin? Yes  - ACE-I/ARB? Yes  - Aspirin? No    Current monitoring regimen:   Patient is using: glucometer    SMBG Readings: none to report    Any episodes of hypoglycemia? No  Hypoglycemia awareness? No    Pertinent PMH Review:  - PMH of Pancreatitis: No  - PMH/FH of Medullary Thyroid Cancer: No  - PMH of Retinopathy: No  - PMH of Urinary Tract Infections: No    Lab Review  Lab Results   Component Value Date    BILITOT 0.4 01/10/2025    CALCIUM 9.7 01/10/2025    CO2 27 01/10/2025     01/10/2025    CREATININE 1.90 (H) 01/10/2025    GLUCOSE 187 (H) 01/10/2025    ALKPHOS 50 01/10/2025    K 5.2 01/10/2025    PROT 7.0 01/10/2025     01/10/2025    AST 14 01/10/2025    ALT 9 01/10/2025    BUN 48 (H) 01/10/2025    ANIONGAP 15 01/10/2025    ALBUMIN 4.3 01/10/2025    GFRF 43 (A) 01/16/2023     Lab Results   Component Value Date    TRIG 188 (H) 01/10/2025    CHOL 197 01/10/2025    LDLCALC 109 (H) 01/10/2025    HDL 50.6 01/10/2025     Lab Results   Component Value Date    HGBA1C 7.7 (H) 01/10/2025     The ASCVD Risk score (Eliecer JETER, et al., 2019) failed to calculate for the following reasons:    The 2019 ASCVD risk score is only valid for ages 40 to 79    Assessment/Plan   Patient had no issues with Jardiance. A1C has stayed stable since last visit. She is being monitored closely with nephrology and recently had her lisinopril increased for further  kidney protection. Will continue to monitor GFR. May decrease metformin if kidney function continues to decline.    Type 2 diabetes mellitus, is at goal. <8%    CONTINUE Jardiance 25 mg daily  Follow up: I recommend diabetes care be 8 weeks.    Xiomara Luke, PharmD    Continue all meds under the continuation of care with the referring provider and clinical pharmacy team

## 2025-04-24 PROCEDURE — RXMED WILLOW AMBULATORY MEDICATION CHARGE

## 2025-04-25 ENCOUNTER — PHARMACY VISIT (OUTPATIENT)
Dept: PHARMACY | Facility: CLINIC | Age: 85
End: 2025-04-25
Payer: COMMERCIAL

## 2025-05-09 LAB
25(OH)D3+25(OH)D2 SERPL-MCNC: 62 NG/ML (ref 30–100)
ANION GAP SERPL CALCULATED.4IONS-SCNC: 17 MMOL/L (CALC) (ref 7–17)
BUN SERPL-MCNC: 52 MG/DL (ref 7–25)
BUN/CREAT SERPL: 27 (CALC) (ref 6–22)
CALCIUM SERPL-MCNC: 9.2 MG/DL (ref 8.6–10.4)
CHLORIDE SERPL-SCNC: 98 MMOL/L (ref 98–110)
CO2 SERPL-SCNC: 24 MMOL/L (ref 20–32)
CREAT SERPL-MCNC: 1.92 MG/DL (ref 0.6–0.95)
CREAT UR-MCNC: 83 MG/DL (ref 20–275)
EGFRCR SERPLBLD CKD-EPI 2021: 25 ML/MIN/1.73M2
GLUCOSE SERPL-MCNC: 185 MG/DL (ref 65–99)
POTASSIUM SERPL-SCNC: 4.3 MMOL/L (ref 3.5–5.3)
PROT UR-MCNC: 31 MG/DL (ref 5–24)
PROT/CREAT UR: 0.37 MG/MG CREAT (ref 0.02–0.18)
PROT/CREAT UR: 373 MG/G CREAT (ref 24–184)
PTH-INTACT SERPL-MCNC: 29 PG/ML (ref 16–77)
SODIUM SERPL-SCNC: 139 MMOL/L (ref 135–146)

## 2025-05-16 ENCOUNTER — APPOINTMENT (OUTPATIENT)
Dept: NEPHROLOGY | Facility: CLINIC | Age: 85
End: 2025-05-16
Payer: MEDICARE

## 2025-05-16 VITALS
HEART RATE: 85 BPM | SYSTOLIC BLOOD PRESSURE: 164 MMHG | DIASTOLIC BLOOD PRESSURE: 71 MMHG | HEIGHT: 66 IN | WEIGHT: 151 LBS | BODY MASS INDEX: 24.27 KG/M2

## 2025-05-16 DIAGNOSIS — E08.22 DIABETES MELLITUS DUE TO UNDERLYING CONDITION WITH DIABETIC CHRONIC KIDNEY DISEASE, UNSPECIFIED CKD STAGE, UNSPECIFIED WHETHER LONG TERM INSULIN USE: ICD-10-CM

## 2025-05-16 DIAGNOSIS — N18.4 CKD (CHRONIC KIDNEY DISEASE), STAGE IV (MULTI): Primary | ICD-10-CM

## 2025-05-16 DIAGNOSIS — I10 ESSENTIAL HYPERTENSION: ICD-10-CM

## 2025-05-16 DIAGNOSIS — R80.8 OTHER PROTEINURIA: ICD-10-CM

## 2025-05-16 PROCEDURE — 3077F SYST BP >= 140 MM HG: CPT | Performed by: INTERNAL MEDICINE

## 2025-05-16 PROCEDURE — 1036F TOBACCO NON-USER: CPT | Performed by: INTERNAL MEDICINE

## 2025-05-16 PROCEDURE — 3078F DIAST BP <80 MM HG: CPT | Performed by: INTERNAL MEDICINE

## 2025-05-16 PROCEDURE — 1159F MED LIST DOCD IN RCRD: CPT | Performed by: INTERNAL MEDICINE

## 2025-05-16 PROCEDURE — 99214 OFFICE O/P EST MOD 30 MIN: CPT | Performed by: INTERNAL MEDICINE

## 2025-05-16 RX ORDER — BENAZEPRIL HYDROCHLORIDE AND HYDROCHLOROTHIAZIDE 20; 25 MG/1; MG/1
1 TABLET ORAL DAILY
COMMUNITY
End: 2025-05-16 | Stop reason: WASHOUT

## 2025-05-16 RX ORDER — NEBIVOLOL 5 MG/1
5 TABLET ORAL DAILY
Qty: 90 TABLET | Refills: 3 | Status: SHIPPED | OUTPATIENT
Start: 2025-05-16 | End: 2026-05-16

## 2025-05-16 NOTE — PROGRESS NOTES
Medication: atorvastatin passed protocol.   Last office visit date: 9/12/2024   Next appointment scheduled?: Yes 12/30/2024   Number of refills given: 1     Meghna Escobedo   84 y.o.    @@  [facility]/Room: [id number]/Room/bed info not found    Subjective:   The patient is being seen for a routine clinic follow-up of chronic kidney disease. Recently, the disease has been stable. Disease complications:  No hyperkalemia, no hypocalcemia, no hyperphosphatemia, no metabolic acidosis, no coagulopathy, no uremic encephalopathy, no neuropathy and no renal osteodystrophy. The patient is currently asymptomatic. No associated symptoms are reported.       Meds:   Current Medications[1]       ROS:  The patient is awake and oriented. No dizziness or lightheadedness. No chills and no fever. No headaches. No nausea and no vomiting. No shortness of breath. No cough. No chest pain. No abdominal pain. No diarrhea. No hematemesis or hemoptysis. No hematuria. No rectal bleeding. No melena. No epistaxis. No urinary symptoms. No flank pain. No leg edema. No itching. Overall, the rest of the review of systems is also negative.  12 point review of systems otherwise negative as stated in HPI.        Physical Examination:        Vitals:    05/16/25 0955   BP: 164/71   Pulse: 85     General: The patient is awake, oriented, and is not in any distress.  Head and Neck: Normocephalic. No periorbital edema.  Eyes: non-icteric  Respiratory: Symmetric chest expansion. No respiratory distress.  Skin: No maculopapular rash.  Musculoskeletal: No peripheral edema.  Neuro Exam: Speech is fluent. Moves extremities.    Imaging:  === 01/17/25 ===    US RENAL COMPLETE    - Impression -  Bilateral renal cortical cysts as detailed above.    No hydronephrosis bilaterally.    No shadowing calculus bilaterally.    Renal cortical thinning and increased echogenicity on the left.    The urinary bladder is poorly distended and not well evaluated on  this examination.      MACRO:  None    Signed by: Levi Hay 1/18/2025 9:05 AM  Dictation workstation:   [id number]       Blood Labs:  No results found for this or any  previous visit (from the past 24 hours).   Lab Results   Component Value Date    PTH 29 05/08/2025      Lab Results   Component Value Date    GLUCOSE 185 (H) 05/08/2025    CALCIUM 9.2 05/08/2025     05/08/2025    K 4.3 05/08/2025    CO2 24 05/08/2025    CL 98 05/08/2025    BUN 52 (H) 05/08/2025    CREATININE 1.92 (H) 05/08/2025         Assessment and Plan:  #1  Chronic kidney disease stage IV.  Last creatinine was 1.92.  Normal potassium and bicarb level.  Kidney ultrasound shows a small left kidney.  Volume status is good.  No microscopic hematuria.     2.  Proteinuria.  She has about 373 mg proteinuria.  This is because of diabetic nephropathy.  She is on Jardiance and lisinopril.      3.  Hypertension.  Blood pressure is not controlled control.  I added Bystolic to her medications.  We talked about low-salt diet.  I noticed that she has been taking benazepril/HCTZ on top of her lisinopril and chlorthalidone.  This was not listed under her medications last time.  I asked her to stop it.    I will see her in about 4-month for follow-up          Catracho Landry MD  Senior Attending Physician  Director of Onco-Nephrology Program  Division of Nephrology & Hypertension  [facility]       [1]   Current Outpatient Medications   Medication Sig Dispense Refill    acetaminophen (Tylenol) 325 mg tablet 2 tablets (650 mg). (Patient taking differently: 2 tablets (650 mg) if needed.)      amLODIPine (Norvasc) 10 mg tablet Take 1 tablet (10 mg) by mouth once daily. 90 each 3    benazepriL-hydrochlorothiazide (Lotensin HCT) 20-25 mg tablet Take 1 tablet by mouth once daily.      blood sugar diagnostic (True Metrix Glucose Test Strip) strip TEST blood sugar twice daily 300 strip 0    calcium carbonate-vitamin D3 (Calcium 600 + D,3,) 600 mg-10 mcg (400 unit) tablet Take 1 tablet by mouth once daily.      chlorthalidone (Hygroton) 25 mg tablet TAKE 1 TABLET DAILY 90 tablet 3    empagliflozin  (Jardiance) 25 mg tablet Take 1 tablet (25 mg) by mouth once daily. 90 tablet 3    fenofibrate (Tricor) 48 mg tablet TAKE 1 TABLET DAILY 90 tablet 3    lisinopril 40 mg tablet Take 1 tablet (40 mg) by mouth once daily. 90 tablet 3    metFORMIN (Glucophage) 1,000 mg tablet Take 1 tablet (1,000 mg) by mouth 2 times daily (morning and late afternoon). 180 tablet 3    omeprazole (PriLOSEC) 20 mg DR capsule Take 1 capsule (20 mg) by mouth once daily in the morning. Take before meals. 90 capsule 3    pravastatin (Pravachol) 80 mg tablet Take 1 tablet (80 mg) by mouth once daily. 90 tablet 3     No current facility-administered medications for this visit.

## 2025-05-19 ENCOUNTER — APPOINTMENT (OUTPATIENT)
Dept: PHARMACY | Facility: HOSPITAL | Age: 85
End: 2025-05-19
Payer: COMMERCIAL

## 2025-05-22 ENCOUNTER — APPOINTMENT (OUTPATIENT)
Dept: PHARMACY | Facility: HOSPITAL | Age: 85
End: 2025-05-22
Payer: MEDICARE

## 2025-05-22 DIAGNOSIS — E11.22 TYPE 2 DIABETES MELLITUS WITH STAGE 3B CHRONIC KIDNEY DISEASE, WITHOUT LONG-TERM CURRENT USE OF INSULIN (MULTI): ICD-10-CM

## 2025-05-22 DIAGNOSIS — N18.32 TYPE 2 DIABETES MELLITUS WITH STAGE 3B CHRONIC KIDNEY DISEASE, WITHOUT LONG-TERM CURRENT USE OF INSULIN (MULTI): ICD-10-CM

## 2025-05-23 NOTE — PROGRESS NOTES
Subjective     Patient ID: Meghna Escobedo is a 84 y.o. female who presents for diabetes management.    Diabetes  She presents for her follow-up diabetic visit. She has type 2 diabetes mellitus. Risk factors for coronary artery disease include diabetes mellitus, hypertension and dyslipidemia. Current diabetic treatment includes oral agent (dual therapy). An ACE inhibitor/angiotensin II receptor blocker is being taken.     No Known Allergies    Objective     Current DM Pharmacotherapy:   -metformin 1,000 mg 1 po bid  -Jardiance 25 mg daily    SECONDARY PREVENTION  - Statin? Yes  - ACE-I/ARB? Yes  - Aspirin? No    Current monitoring regimen:   Patient is using: glucometer    SMBG Readings: none to report    Any episodes of hypoglycemia? No  Hypoglycemia awareness? No    Pertinent PMH Review:  - PMH of Pancreatitis: No  - PMH/FH of Medullary Thyroid Cancer: No  - PMH of Retinopathy: No  - PMH of Urinary Tract Infections: No    Lab Review  Lab Results   Component Value Date    BILITOT 0.4 01/10/2025    CALCIUM 9.2 05/08/2025    CO2 24 05/08/2025    CL 98 05/08/2025    CREATININE 1.92 (H) 05/08/2025    GLUCOSE 185 (H) 05/08/2025    ALKPHOS 50 01/10/2025    K 4.3 05/08/2025    PROT 7.0 01/10/2025     05/08/2025    AST 14 01/10/2025    ALT 9 01/10/2025    BUN 52 (H) 05/08/2025    ANIONGAP 17 05/08/2025    ALBUMIN 4.3 01/10/2025    GFRF 43 (A) 01/16/2023     Lab Results   Component Value Date    TRIG 188 (H) 01/10/2025    CHOL 197 01/10/2025    LDLCALC 109 (H) 01/10/2025    HDL 50.6 01/10/2025     Lab Results   Component Value Date    HGBA1C 7.7 (H) 01/10/2025     The ASCVD Risk score (Eliecer JETER, et al., 2019) failed to calculate for the following reasons:    The 2019 ASCVD risk score is only valid for ages 40 to 79    Assessment/Plan   Patient had no issues with Jardiance. Kidney function has maintained since last visit. Will continue to monitor GFR. Patient's  PAP needs to renew before 8/8/25. Will follow up in 2  months for  PAP renewal.    Type 2 diabetes mellitus, is at goal. <8%    CONTINUE Jardiance 25 mg daily  Follow up: I recommend diabetes care be 8 weeks.    Xiomara Luke, PharmD    Continue all meds under the continuation of care with the referring provider and clinical pharmacy team

## 2025-07-21 PROCEDURE — RXMED WILLOW AMBULATORY MEDICATION CHARGE

## 2025-07-22 ENCOUNTER — PHARMACY VISIT (OUTPATIENT)
Dept: PHARMACY | Facility: CLINIC | Age: 85
End: 2025-07-22
Payer: COMMERCIAL

## 2025-07-28 ENCOUNTER — APPOINTMENT (OUTPATIENT)
Dept: PHARMACY | Facility: HOSPITAL | Age: 85
End: 2025-07-28
Payer: MEDICARE

## 2025-07-28 DIAGNOSIS — N18.32 TYPE 2 DIABETES MELLITUS WITH STAGE 3B CHRONIC KIDNEY DISEASE, WITHOUT LONG-TERM CURRENT USE OF INSULIN (MULTI): Primary | ICD-10-CM

## 2025-07-28 DIAGNOSIS — E11.22 TYPE 2 DIABETES MELLITUS WITH STAGE 3B CHRONIC KIDNEY DISEASE, WITHOUT LONG-TERM CURRENT USE OF INSULIN (MULTI): Primary | ICD-10-CM

## 2025-07-29 NOTE — PROGRESS NOTES
Subjective     Patient ID: Meghna Escobedo is a 84 y.o. female who presents for diabetes management.    Diabetes  She presents for her follow-up diabetic visit. She has type 2 diabetes mellitus. Risk factors for coronary artery disease include diabetes mellitus, hypertension and dyslipidemia. Current diabetic treatment includes oral agent (dual therapy). An ACE inhibitor/angiotensin II receptor blocker is being taken.     No Known Allergies    Objective     Current DM Pharmacotherapy:   -metformin 1,000 mg 1 po bid  -Jardiance 25 mg daily    SECONDARY PREVENTION  - Statin? Yes  - ACE-I/ARB? Yes  - Aspirin? No    Current monitoring regimen:   Patient is using: glucometer    SMBG Readings: none to report    Any episodes of hypoglycemia? No  Hypoglycemia awareness? No    Pertinent PMH Review:  - PMH of Pancreatitis: No  - PMH/FH of Medullary Thyroid Cancer: No  - PMH of Retinopathy: No  - PMH of Urinary Tract Infections: No    Lab Review  Lab Results   Component Value Date    BILITOT 0.4 01/10/2025    CALCIUM 9.2 05/08/2025    CO2 24 05/08/2025    CL 98 05/08/2025    CREATININE 1.92 (H) 05/08/2025    GLUCOSE 185 (H) 05/08/2025    ALKPHOS 50 01/10/2025    K 4.3 05/08/2025    PROT 7.0 01/10/2025     05/08/2025    AST 14 01/10/2025    ALT 9 01/10/2025    BUN 52 (H) 05/08/2025    ANIONGAP 17 05/08/2025    ALBUMIN 4.3 01/10/2025    GFRF 43 (A) 01/16/2023     Lab Results   Component Value Date    TRIG 188 (H) 01/10/2025    CHOL 197 01/10/2025    LDLCALC 109 (H) 01/10/2025    HDL 50.6 01/10/2025     Lab Results   Component Value Date    HGBA1C 7.7 (H) 01/10/2025     The ASCVD Risk score (Eliecer JETER, et al., 2019) failed to calculate for the following reasons:    The 2019 ASCVD risk score is only valid for ages 40 to 79    Assessment/Plan   Patient had no issues with Jardiance. She is due for  PAP renewal. Patient to drop off updated tax documents. Will follow up in 2 weeks.    Type 2 diabetes mellitus, is at goal.  <8%    CONTINUE Jardiance 25 mg daily  Follow up: I recommend diabetes care be 2 weeks.    Xiomara Luke, PharmD    Continue all meds under the continuation of care with the referring provider and clinical pharmacy team

## 2025-08-04 ENCOUNTER — APPOINTMENT (OUTPATIENT)
Dept: PRIMARY CARE | Facility: CLINIC | Age: 85
End: 2025-08-04
Payer: MEDICARE

## 2025-08-11 ENCOUNTER — APPOINTMENT (OUTPATIENT)
Dept: PHARMACY | Facility: HOSPITAL | Age: 85
End: 2025-08-11
Payer: MEDICARE

## 2025-08-11 DIAGNOSIS — N18.32 TYPE 2 DIABETES MELLITUS WITH STAGE 3B CHRONIC KIDNEY DISEASE, WITHOUT LONG-TERM CURRENT USE OF INSULIN (MULTI): Primary | ICD-10-CM

## 2025-08-11 DIAGNOSIS — E11.22 TYPE 2 DIABETES MELLITUS WITH STAGE 3B CHRONIC KIDNEY DISEASE, WITHOUT LONG-TERM CURRENT USE OF INSULIN (MULTI): Primary | ICD-10-CM

## 2025-08-12 DIAGNOSIS — I10 HTN (HYPERTENSION), BENIGN: ICD-10-CM

## 2025-08-12 RX ORDER — PRAVASTATIN SODIUM 80 MG/1
80 TABLET ORAL DAILY
Qty: 90 TABLET | Refills: 0 | Status: SHIPPED | OUTPATIENT
Start: 2025-08-12

## 2025-09-09 ENCOUNTER — APPOINTMENT (OUTPATIENT)
Dept: PRIMARY CARE | Facility: CLINIC | Age: 85
End: 2025-09-09
Payer: MEDICARE

## 2025-09-19 ENCOUNTER — APPOINTMENT (OUTPATIENT)
Dept: NEPHROLOGY | Facility: CLINIC | Age: 85
End: 2025-09-19
Payer: MEDICARE

## 2025-09-29 ENCOUNTER — APPOINTMENT (OUTPATIENT)
Dept: PHARMACY | Facility: HOSPITAL | Age: 85
End: 2025-09-29
Payer: MEDICARE